# Patient Record
Sex: FEMALE | Race: WHITE | NOT HISPANIC OR LATINO | Employment: FULL TIME | URBAN - METROPOLITAN AREA
[De-identification: names, ages, dates, MRNs, and addresses within clinical notes are randomized per-mention and may not be internally consistent; named-entity substitution may affect disease eponyms.]

---

## 2017-01-16 ENCOUNTER — APPOINTMENT (OUTPATIENT)
Dept: WOMENS IMAGING | Facility: HOSPITAL | Age: 52
End: 2017-01-16

## 2017-01-16 PROCEDURE — 77067 SCR MAMMO BI INCL CAD: CPT | Performed by: RADIOLOGY

## 2018-06-14 ENCOUNTER — APPOINTMENT (OUTPATIENT)
Dept: WOMENS IMAGING | Facility: HOSPITAL | Age: 53
End: 2018-06-14

## 2018-06-14 PROCEDURE — 77063 BREAST TOMOSYNTHESIS BI: CPT | Performed by: RADIOLOGY

## 2018-06-14 PROCEDURE — 77067 SCR MAMMO BI INCL CAD: CPT | Performed by: RADIOLOGY

## 2018-10-29 ENCOUNTER — OFFICE VISIT CONVERTED (OUTPATIENT)
Dept: FAMILY MEDICINE CLINIC | Age: 53
End: 2018-10-29
Attending: FAMILY MEDICINE

## 2019-08-12 ENCOUNTER — OFFICE VISIT CONVERTED (OUTPATIENT)
Dept: FAMILY MEDICINE CLINIC | Age: 54
End: 2019-08-12
Attending: FAMILY MEDICINE

## 2019-08-12 ENCOUNTER — HOSPITAL ENCOUNTER (OUTPATIENT)
Dept: OTHER | Facility: HOSPITAL | Age: 54
Discharge: HOME | End: 2019-08-12
Attending: FAMILY MEDICINE

## 2019-08-12 LAB
ALBUMIN SERPL-MCNC: 4.2 G/DL (ref 3.5–5)
ALBUMIN/GLOB SERPL: 1.3 {RATIO} (ref 1.4–2.6)
ALP SERPL-CCNC: 76 U/L (ref 53–141)
ALT SERPL-CCNC: 21 U/L (ref 10–40)
ANION GAP SERPL CALC-SCNC: 18 MMOL/L (ref 8–19)
AST SERPL-CCNC: 25 U/L (ref 15–50)
BILIRUB SERPL-MCNC: 0.26 MG/DL (ref 0.2–1.3)
BUN SERPL-MCNC: 17 MG/DL (ref 5–25)
BUN/CREAT SERPL: 20 {RATIO} (ref 6–20)
CALCIUM SERPL-MCNC: 9.7 MG/DL (ref 8.7–10.4)
CHLORIDE SERPL-SCNC: 104 MMOL/L (ref 99–111)
CHOLEST SERPL-MCNC: 187 MG/DL (ref 107–200)
CHOLEST/HDLC SERPL: 4.9 {RATIO} (ref 3–6)
CONV CO2: 23 MMOL/L (ref 22–32)
CONV TOTAL PROTEIN: 7.5 G/DL (ref 6.3–8.2)
CREAT UR-MCNC: 0.84 MG/DL (ref 0.5–0.9)
ERYTHROCYTE [DISTWIDTH] IN BLOOD BY AUTOMATED COUNT: 12.5 % (ref 11.7–14.4)
GFR SERPLBLD BASED ON 1.73 SQ M-ARVRAT: >60 ML/MIN/{1.73_M2}
GLOBULIN UR ELPH-MCNC: 3.3 G/DL (ref 2–3.5)
GLUCOSE SERPL-MCNC: 90 MG/DL (ref 65–99)
HCT VFR BLD AUTO: 41.6 % (ref 37–47)
HDLC SERPL-MCNC: 38 MG/DL (ref 40–60)
HGB BLD-MCNC: 13.8 G/DL (ref 12–16)
LDLC SERPL CALC-MCNC: 106 MG/DL (ref 70–100)
MCH RBC QN AUTO: 30.8 PG (ref 27–31)
MCHC RBC AUTO-ENTMCNC: 33.2 G/DL (ref 33–37)
MCV RBC AUTO: 92.9 FL (ref 81–99)
OSMOLALITY SERPL CALC.SUM OF ELEC: 293 MOSM/KG (ref 273–304)
PLATELET # BLD AUTO: 170 10*3/UL (ref 130–400)
PMV BLD AUTO: 11.7 FL (ref 9.4–12.3)
POTASSIUM SERPL-SCNC: 3.8 MMOL/L (ref 3.5–5.3)
RBC # BLD AUTO: 4.48 10*6/UL (ref 4.2–5.4)
SODIUM SERPL-SCNC: 141 MMOL/L (ref 135–147)
T4 FREE SERPL-MCNC: 1.8 NG/DL (ref 0.9–1.8)
TRIGL SERPL-MCNC: 217 MG/DL (ref 40–150)
TSH SERPL-ACNC: 0.31 M[IU]/L (ref 0.27–4.2)
VLDLC SERPL-MCNC: 43 MG/DL (ref 5–37)
WBC # BLD AUTO: 4.7 10*3/UL (ref 4.8–10.8)

## 2019-10-02 ENCOUNTER — APPOINTMENT (OUTPATIENT)
Dept: WOMENS IMAGING | Facility: HOSPITAL | Age: 54
End: 2019-10-02

## 2019-10-02 PROCEDURE — 77063 BREAST TOMOSYNTHESIS BI: CPT | Performed by: RADIOLOGY

## 2019-10-02 PROCEDURE — 77067 SCR MAMMO BI INCL CAD: CPT | Performed by: RADIOLOGY

## 2020-12-08 ENCOUNTER — OFFICE VISIT CONVERTED (OUTPATIENT)
Dept: FAMILY MEDICINE CLINIC | Age: 55
End: 2020-12-08
Attending: FAMILY MEDICINE

## 2020-12-28 ENCOUNTER — APPOINTMENT (OUTPATIENT)
Dept: WOMENS IMAGING | Facility: HOSPITAL | Age: 55
End: 2020-12-28

## 2020-12-28 PROCEDURE — 77067 SCR MAMMO BI INCL CAD: CPT | Performed by: RADIOLOGY

## 2020-12-28 PROCEDURE — 77063 BREAST TOMOSYNTHESIS BI: CPT | Performed by: RADIOLOGY

## 2021-01-07 ENCOUNTER — HOSPITAL ENCOUNTER (OUTPATIENT)
Dept: OTHER | Facility: HOSPITAL | Age: 56
Discharge: HOME | End: 2021-01-07
Attending: FAMILY MEDICINE

## 2021-01-07 LAB
ALBUMIN SERPL-MCNC: 4.3 G/DL (ref 3.5–5)
ALBUMIN/GLOB SERPL: 1.3 {RATIO} (ref 1.4–2.6)
ALP SERPL-CCNC: 87 U/L (ref 53–141)
ALT SERPL-CCNC: 32 U/L (ref 10–40)
ANION GAP SERPL CALC-SCNC: 13 MMOL/L (ref 8–19)
AST SERPL-CCNC: 30 U/L (ref 15–50)
BILIRUB SERPL-MCNC: 0.4 MG/DL (ref 0.2–1.3)
BUN SERPL-MCNC: 15 MG/DL (ref 5–25)
BUN/CREAT SERPL: 17 {RATIO} (ref 6–20)
CALCIUM SERPL-MCNC: 9.4 MG/DL (ref 8.7–10.4)
CHLORIDE SERPL-SCNC: 101 MMOL/L (ref 99–111)
CHOLEST SERPL-MCNC: 215 MG/DL (ref 107–200)
CHOLEST/HDLC SERPL: 4.5 {RATIO} (ref 3–6)
CONV CO2: 28 MMOL/L (ref 22–32)
CONV TOTAL PROTEIN: 7.6 G/DL (ref 6.3–8.2)
CREAT UR-MCNC: 0.87 MG/DL (ref 0.5–0.9)
ERYTHROCYTE [DISTWIDTH] IN BLOOD BY AUTOMATED COUNT: 12.1 % (ref 11.5–14.5)
GFR SERPLBLD BASED ON 1.73 SQ M-ARVRAT: >60 ML/MIN/{1.73_M2}
GLOBULIN UR ELPH-MCNC: 3.3 G/DL (ref 2–3.5)
GLUCOSE SERPL-MCNC: 90 MG/DL (ref 65–99)
HBA1C MFR BLD: 15.4 G/DL (ref 12–16)
HCT VFR BLD AUTO: 44.9 % (ref 37–47)
HDLC SERPL-MCNC: 48 MG/DL (ref 40–60)
LDLC SERPL CALC-MCNC: 143 MG/DL (ref 70–100)
MCH RBC QN AUTO: 30.7 PG (ref 27–31)
MCHC RBC AUTO-ENTMCNC: 34.3 G/DL (ref 33–37)
MCV RBC AUTO: 89.6 FL (ref 81–99)
OSMOLALITY SERPL CALC.SUM OF ELEC: 286 MOSM/KG (ref 273–304)
PLATELET # BLD AUTO: 186 10*3/UL (ref 130–400)
PMV BLD AUTO: 11.1 FL (ref 7.4–10.4)
POTASSIUM SERPL-SCNC: 3.9 MMOL/L (ref 3.5–5.3)
RBC # BLD AUTO: 5.01 10*6/UL (ref 4.2–5.4)
SODIUM SERPL-SCNC: 138 MMOL/L (ref 135–147)
TRIGL SERPL-MCNC: 120 MG/DL (ref 40–150)
VLDLC SERPL-MCNC: 24 MG/DL (ref 5–37)
WBC # BLD AUTO: 5.5 10*3/UL (ref 4.8–10.8)

## 2021-05-18 NOTE — PROGRESS NOTES
Mack, Caity BRYSON 1965     Office/Outpatient Visit    Visit Date: Mon, Oct 29, 2018 03:55 pm    Provider: Preston Stafford MD     Location: [facility]        Electronically signed by Preston Stafford MD on  10/30/2018 06:47:05 AM                             SUBJECTIVE:        CC: blood pressure         HPI:         Patient presents with hypertension.  Her current cardiac medication regimen includes an angiotensin receptor blocker ( Cozaar ).  She has not kept a blood pressure diary, but states that pressures have been okay.  She is tolerating the medication well without side effects.  Compliance with treatment has been good; she takes her medication as directed and follows up as directed.      ROS:     CONSTITUTIONAL:  Negative for chills and fever.      CARDIOVASCULAR:  Negative for chest pain and palpitations.      RESPIRATORY:  Negative for recent cough and dyspnea.      GASTROINTESTINAL:  Negative for abdominal pain, nausea and vomiting.          PM/[facility]/:     Last Reviewed on 10/29/2018 03:55 PM by Preston Stafford    Past Medical History:                 PAST MEDICAL HISTORY             PREVENTIVE HEALTH MAINTENANCE             COLORECTAL CANCER SCREENING: Up to date (colonoscopy q10y; sigmoidoscopy q5y; Cologuard q3y) was last done 05/2018, Results are in chart; colonoscopy with normal results     MAMMOGRAM: Done within last 2 years and results in are chart was last done 01/2017 with normal results         Surgical History:         L Ankle;         Family History:         Positive for Coronary Artery Disease ( father ).      Positive for Colon Cancer ( father ).      Positive for Type 2 Diabetes ( father ) and Hypothyroidism ( mother ).          Social History:     Occupation: Employed at [employer]. ;     Marital Status:      Children: 3 children         Tobacco/Alcohol/Supplements:     Last Reviewed on 10/29/2018 03:55 PM by Preston Stafford  German    Tobacco: She has never smoked.          Substance Abuse History:     Last Reviewed on 10/29/2018 03:55 PM by Preston Stafford    NEGATIVE         Mental Health History:     Last Reviewed on 10/29/2018 03:55 PM by Preston Stafford        Communicable Diseases (eg STDs):     Last Reviewed on 10/29/2018 03:55 PM by Preston Stafford            Current Problems:     Last Reviewed on 10/29/2018 03:55 PM by Preston Stafford    Hypertension     Acquired hypothyroidism         Immunizations:     None        Allergies:     Last Reviewed on 10/29/2018 03:55 PM by Preston Stafford      No Known Drug Allergies.         Current Medications:     Last Reviewed on 10/29/2018 03:55 PM by Preston Stafford    Losartan/Hydrochlorothiazide 100mg/25mg Tablet Take 1 tablet(s) by mouth daily     Synthroid 0.15mg Tablet 1 tab daily     Ferrous Sulfate         OBJECTIVE:        Vitals:         Current: 10/29/2018 4:06:42 PM    Ht:  5 ft, 8.25 in;  Wt: 171.4 lbs;  BMI: 25.9    T: 97.4 F (oral);  BP: 134/75 mm Hg (right arm, sitting);  P: 60 bpm (left arm (BP Cuff), sitting);  sCr: 0.63 mg/dL;  GFR: 116.01        Exams:     PHYSICAL EXAM:     GENERAL: vital signs recorded - well developed, well nourished;  no apparent distress;     EYES: extraocular movements intact; conjunctiva and cornea are normal; PERRLA;     E/N/T:  normal EACs, TMs, nasal/oral mucosa, teeth, gingiva, and oropharynx;     NECK: range of motion is normal; thyroid is non-palpable;     RESPIRATORY: normal respiratory rate and pattern with no distress; normal breath sounds with no rales, rhonchi, wheezes or rubs;     CARDIOVASCULAR: normal rate; rhythm is regular;  no systolic murmur; no edema;     GASTROINTESTINAL: nontender; normal bowel sounds; no organomegaly;     BREAST/INTEGUMENT: SKIN: no significant rashes or lesions; no suspicious moles;     PSYCHIATRIC:  appropriate affect and demeanor; normal speech pattern; grossly  normal memory;         ASSESSMENT           401.1   I10  Hypertension              DDx:         ORDERS:         Meds Prescribed:       Refill of: Losartan/Hydrochlorothiazide 100mg/12.5mg Tablet Take 1 tablet(s) by mouth daily  #90 (Ninety) tablet(s) Refills: 1         Radiology/Test Orders:       3014F  Screening mammography results documented and reviewed (PV)1  (In-House)         3017F  Colorectal CA screen results documented and reviewed (PV)  (In-House)           Lab Orders:       [id number]  HTNLP - [facility] CMP AND LIPID: [id number], [id number]  (Send-Out)           Other Orders:         Calculated BMI above the upper parameter and a follow-up plan was documented in the medical record  (In-House)                   PLAN:          Hypertension Katja's blood pressure is okay today.  We are going to add back a low dose of HCTZ for the winter months anyway.  We will arrange fasting labs at her convenience.  She declines my flu shot.      LABORATORY:  Labs ordered to be performed today include HTN/Lipid Panel: CMP, Lipid.  MIPS     MAMMOGRAM: Done within last 2 years and results in are chart     COLORECTAL CANCER SCREENING: Results are in chart     BMI Elevated - Follow-Up Plan: She was provided education on weight loss strategies           Prescriptions:       Refill of: Losartan/Hydrochlorothiazide 100mg/12.5mg Tablet Take 1 tablet(s) by mouth daily  #90 (Ninety) tablet(s) Refills: 1           Orders:       [id number]  HTN - [facility] CMP AND LIPID: [id number], [id number]  (Send-Out)         3014F  Screening mammography results documented and reviewed (PV)1  (In-House)         3017F  Colorectal CA screen results documented and reviewed (PV)  (In-House)           Calculated BMI above the upper parameter and a follow-up plan was documented in the medical record  (In-House)             Patient Education Handouts:       High Blood Pressure (HTN)              CHARGE CAPTURE           **Please note: ICD descriptions below are intended for billing  purposes only and may not represent clinical diagnoses**        Primary Diagnosis:         401.1 Hypertension            I10    Essential (primary) hypertension              Orders:          73921   Office/outpatient visit; established patient, level 3  (In-House)             3014F   Screening mammography results documented and reviewed (PV)1  (In-House)             3017F   Colorectal CA screen results documented and reviewed (PV)  (In-House)                Calculated BMI above the upper parameter and a follow-up plan was documented in the medical record  (In-House)

## 2021-05-18 NOTE — PROGRESS NOTES
Caity Mack  1965     Office/Outpatient Visit    Visit Date: Tue, Dec 8, 2020 12:47 pm    Provider: Preston Stafford MD (Assistant: Johanne Dickens,  )    Location: [facility]        Electronically signed by Preston Stafford MD on  12/09/2020 09:06:17 AM                             Subjective:        CC: physical exam, blood pressure, thyroid        TELEMEDICINE VISIT:    - Katja consented to this telemedicine visit.    - Persons present during the telemedicine consultation include:  Katja - patient, Dr. Stafford    - This visit is being conducted over FaceTime with audio and video.    HPI:       Katja is being seen for wellness exam.  She is 55 years old and works for [employer] as a .  She has been there since 1999.  She does not smoke.  She uses some alcohol - maybe every few months.  She is up to date on colonoscopy and mammogram.          With regard to the hypothyroidism, unspecified, she is currently taking Synthroid, 150 mcg daily.  TSH was last checked several months ago.  She does not know the results of that test.  She denies any related symptoms.            In regard to the essential (primary) hypertension, her current cardiac medication regimen includes a combination medication ( Hyzaar ).  She has not kept a blood pressure diary, but states that pressures have been well controlled.  She is tolerating the medication well without side effects.  Compliance with treatment has been good; she takes her medication as directed and follows up as directed.      ROS:     CONSTITUTIONAL:  Negative for chills and fever.      CARDIOVASCULAR:  Negative for chest pain and palpitations.      RESPIRATORY:  Negative for recent cough and dyspnea.      GASTROINTESTINAL:  Negative for abdominal pain, nausea and vomiting.      INTEGUMENTARY/BREAST:  Negative for atypical mole(s) and rash.          Past Medical History / Family History / Social History:         Last  Reviewed on 12/08/2020 12:58 PM by Preston Stafford    Past Medical History:                 PAST MEDICAL HISTORY         Hypertension     Hypothyroidism         CURRENT MEDICAL PROVIDERS:    Endocrinologist: Bladimir Ronquillo         PREVENTIVE HEALTH MAINTENANCE             COLORECTAL CANCER SCREENING: Up to date (colonoscopy q10y; sigmoidoscopy q5y; Cologuard q3y) was last done 05/2018, Results are in chart; colonoscopy with normal results     MAMMOGRAM: Done within last 2 years and results in are chart was last done 06/2018 with normal results         Surgical History:         L Ankle;         Family History:         Positive for Coronary Artery Disease ( father ).      Positive for Colon Cancer ( father ).      Positive for Type 2 Diabetes ( father ) and Hypothyroidism ( mother ).          Social History:     Occupation: Employed at [employer]. ;     Marital Status:      Children: 3 children         Tobacco/Alcohol/Supplements:     Last Reviewed on 12/08/2020 12:58 PM by Preston Stafford    Tobacco: She has never smoked.          Substance Abuse History:     Last Reviewed on 12/08/2020 12:58 PM by Preston Stafford    NEGATIVE         Mental Health History:     Last Reviewed on 12/08/2020 12:58 PM by Preston Stafford        Communicable Diseases (eg STDs):     Last Reviewed on 12/08/2020 12:58 PM by Preston Stafford        Current Problems:     Last Reviewed on 12/08/2020 12:58 PM by Preston Stafford    Hypothyroidism, unspecified    Essential (primary) hypertension    Other fatigue        Immunizations:     None        Allergies:     Last Reviewed on 12/08/2020 12:58 PM by Preston Stafford    No Known Allergies.        Current Medications:     Last Reviewed on 12/08/2020 12:58 PM by Preston Stafford    Synthroid 0.137mg Tablet [Take 1 tablet daily by mouth daily]    aspirin 81 mg oral tablet, delayed release (enteric coated) [1 tab  daily]    hydroCHLOROthiazide 12.5 mg oral capsule [take 1 capsule (12.5 mg) by oral route once daily]    losartan 100 mg oral tablet [take 1 tablet (100 mg) by oral route once daily]        Objective:        Exams:     PHYSICAL EXAM:     GENERAL: well developed, well nourished;  no apparent distress;     EYES: extraocular movements intact; conjunctiva and cornea are normal; PERRL;     RESPIRATORY: normal respiratory rate and pattern with no distress;     LYMPHATIC: no enlargement of cervical or facial nodes; no supraclavicular nodes;     BREAST/INTEGUMENT: SKIN: no significant rashes or lesions; no suspicious moles;     NEUROLOGIC: mental status: alert and oriented x 3; cranial nerves II-XII grossly intact;     PSYCHIATRIC: appropriate affect and demeanor; normal psychomotor function;         Assessment:         Z00.01   Encounter for general adult medical examination with abnormal findings       E03.9   Hypothyroidism, unspecified       I10   Essential (primary) hypertension           ORDERS:         Meds Prescribed:       [New Rx] losartan-hydrochlorothiazide 100-12.5 mg oral tablet [take 1 tablet by oral route once daily], #90 (ninety) tablets, Refills: 3 (three)         Lab Orders:       [id number]  CB - [facility] CBC w/o diff  (Send-Out)            30706  HTN - [facility] CMP AND LIPID: [id number], [id number]  (Send-Out)                      Plan:         Encounter for general adult medical examination with abnormal findings    LABORATORY:  Labs ordered to be performed today include CBC W/O DIFF and HTN/Lipid Panel: CMP, Lipid.      RECOMMENDATIONS given include: Katja is doing well overall.  We will refill her medication as noted below.  No other near term change is anticipated.  We will reach out to Dr. Ronquillo for a copy of his most recent consult and testing.  She is up to date on cancer screenings..            Orders:       [id number]  BDCB2 - [facility] CBC w/o diff  (Send-Out)            14235  HTN - [facility] CMP AND LIPID: [id number], [id number]   (Send-Out)              Hypothyroidism, unspecifiedAs above.        Essential (primary) hypertension          Prescriptions:       [New Rx] losartan-hydrochlorothiazide 100-12.5 mg oral tablet [take 1 tablet by oral route once daily], #90 (ninety) tablets, Refills: 3 (three)             Charge Capture:         Primary Diagnosis:     Z00.01  Encounter for general adult medical examination with abnormal findings           Orders:      [id number]  Preventive medicine, established patient, age 40-64 years  (In-House)              E03.9  Hypothyroidism, unspecified     I10  Essential (primary) hypertension

## 2021-05-18 NOTE — PROGRESS NOTES
Caity Mack BRYSON 1965     Office/Outpatient Visit    Visit Date: Mon, Aug 12, 2019 04:00 pm    Provider: Preston Stafford MD (Assistant: Joanne Clemens RN)    Location: [facility]        Electronically signed by Preston Stafford MD on  08/12/2019 09:33:37 PM                             SUBJECTIVE:        CC: blood pressure         HPI:         Patient presents with hypertension.  Her current cardiac medication regimen includes a combination medication ( Hyzaar ).  She has not kept a blood pressure diary, but states that pressures have been well controlled.  She is tolerating the medication well without side effects.  Compliance with treatment has been good; she takes her medication as directed and follows up as directed.      ROS:     CONSTITUTIONAL:  Negative for chills and fever.      CARDIOVASCULAR:  Negative for chest pain and palpitations.      RESPIRATORY:  Negative for recent cough and dyspnea.      GASTROINTESTINAL:  Negative for abdominal pain, nausea and vomiting.      INTEGUMENTARY/BREAST:  Negative for atypical mole(s) and rash.          PM/[facility]/SH:     Last Reviewed on 8/12/2019 04:42 PM by Preston Stafford    Past Medical History:                 PAST MEDICAL HISTORY         Hypertension     Hypothyroidism         CURRENT MEDICAL PROVIDERS:    Endocrinologist: Bladimir Ronquillo         PREVENTIVE HEALTH MAINTENANCE             COLORECTAL CANCER SCREENING: Up to date (colonoscopy q10y; sigmoidoscopy q5y; Cologuard q3y) was last done 05/2018, Results are in chart; colonoscopy with normal results     MAMMOGRAM: Done within last 2 years and results in are chart was last done 06/2018 with normal results         Surgical History:         L Ankle;         Family History:         Positive for Coronary Artery Disease ( father ).      Positive for Colon Cancer ( father ).      Positive for Type 2 Diabetes ( father ) and Hypothyroidism ( mother ).          Social History:     Occupation:  Employed at [employer]. ;     Marital Status:      Children: 3 children         Tobacco/Alcohol/Supplements:     Last Reviewed on 8/12/2019 04:42 PM by Preston Stafford    Tobacco: She has never smoked.          Substance Abuse History:     Last Reviewed on 8/12/2019 04:42 PM by Preston Stafford    NEGATIVE         Mental Health History:     Last Reviewed on 8/12/2019 04:42 PM by Preston Stafford        Communicable Diseases (eg STDs):     Last Reviewed on 8/12/2019 04:42 PM by Preston Stafford            Current Problems:     Last Reviewed on 8/12/2019 04:42 PM by Preston Stafford    Malaise and Fatigue     Hypertension     Acquired hypothyroidism         Immunizations:     None        Allergies:     Last Reviewed on 8/12/2019 04:42 PM by Preston Stafford      No Known Drug Allergies.         Current Medications:     Last Reviewed on 8/12/2019 04:42 PM by Preston Stafford    Losartan/Hydrochlorothiazide 100mg/12.5mg Tablet Take 1 tablet(s) by mouth daily     Synthroid 0.137mg Tablet Take 1 tablet daily by mouth daily     Aspirin (ASA) 81mg Tablets, Enteric Coated 1 tab daily         OBJECTIVE:        Vitals:         Current: 8/12/2019 4:04:15 PM    Ht:  5 ft, 8.25 in;  Wt: 175.6 lbs;  BMI: 26.5    T: 97.7 F (oral);  BP: 137/70 mm Hg (right arm, sitting);  P: 58 bpm (right arm (BP Cuff), sitting);  sCr: 0.63 mg/dL;  GFR: 115.93        Exams:     PHYSICAL EXAM:     GENERAL: vital signs recorded - well developed, well nourished;  no apparent distress;     EYES: extraocular movements intact; conjunctiva and cornea are normal; PERRL;     E/N/T:  normal EACs, TMs, nasal/oral mucosa, teeth, gingiva, and oropharynx;     NECK: range of motion is normal; thyroid is non-palpable;     RESPIRATORY: normal respiratory rate and pattern with no distress; normal breath sounds with no rales, rhonchi, wheezes or rubs;     CARDIOVASCULAR: normal rate; rhythm  is regular;  no systolic murmur; no edema;     GASTROINTESTINAL: nontender; normal bowel sounds; no organomegaly;     LYMPHATIC: no enlargement of cervical or facial nodes; no supraclavicular nodes;     BREAST/INTEGUMENT: SKIN: no significant rashes or lesions; no suspicious moles;         ASSESSMENT           401.1   I10  Hypertension              DDx:     244.8   E03.9  Acquired hypothyroidism              DDx:     780.79   R53.83  Malaise and Fatigue              DDx:         ORDERS:         Meds Prescribed:       Refill of: Losartan/Hydrochlorothiazide 100mg/12.5mg Tablet Take 1 tablet(s) by mouth daily  #90 (Ninety) tablet(s) Refills: 3         Radiology/Test Orders:       3014F  Screening mammography results documented and reviewed (PV)1  (In-House)         3017F  Colorectal CA screen results documented and reviewed (PV)  (In-House)           Lab Orders:       [id number]  BDCB2 - [facility] CBC w/o diff  (Send-Out)  (PLEASE USE Z00.01 AS PRIMARY CODE        ==================================)       68288  COMP - [facility] Comp. Metabolic Panel  (Send-Out)         82091  THYII - [facility] Thyroid panel with TSH (77057, 12738)  (Send-Out)           Other Orders:         Depression screen negative  (In-House)           Negative EtOH screen  (In-House)                   PLAN:          Hypertension     Today, we have reviewed Katja's care.  I'm going to refill needed medications for her as noted and update labs given some ongoing fatigue.  We will follow from there.     MIPS PHQ-9 Depression Screening: Completed form scanned and in chart; Total Score 4; Negative Depression Screen Negative alcohol screen           Prescriptions:       Refill of: Losartan/Hydrochlorothiazide 100mg/12.5mg Tablet Take 1 tablet(s) by mouth daily  #90 (Ninety) tablet(s) Refills: 3           Orders:         Depression screen negative  (In-House)           Negative EtOH screen  (In-House)         3014F  Screening mammography results documented  and reviewed (PV)1  (In-House)         3017F  Colorectal CA screen results documented and reviewed (PV)  (In-House)             Patient Education Handouts:       High Blood Pressure (HTN)           Acquired hypothyroidism     LABORATORY:  Labs ordered to be performed today include Thyroid Panel.            Orders:       [id number]  THYII - [facility] Thyroid panel with TSH (68469, 36333)  (Send-Out)            Malaise and Fatigue     LABORATORY:  Labs ordered to be performed today include CBC W/O DIFF and Comprehensive metabolic panel.            Orders:       [id number]  BDCB2 - [facility] CBC w/o diff  (Send-Out)  (PLEASE USE Z00.01 AS PRIMARY CODE        ==================================)       17359  COMP - [facility] Comp. Metabolic Panel  (Send-Out)               CHARGE CAPTURE           **Please note: ICD descriptions below are intended for billing purposes only and may not represent clinical diagnoses**        Primary Diagnosis:         401.1 Hypertension            I10    Essential (primary) hypertension              Orders:          [id number]   Office/outpatient visit; established patient, level 3  (In-House)                Depression screen negative  (In-House)                Negative EtOH screen  (In-House)             3014F   Screening mammography results documented and reviewed (PV)1  (In-House)             3017F   Colorectal CA screen results documented and reviewed (PV)  (In-House)           244.8 Acquired hypothyroidism            E03.9    Hypothyroidism, unspecified    780.79 Malaise and Fatigue            R53.83    Other fatigue

## 2021-07-01 VITALS
TEMPERATURE: 97.4 F | DIASTOLIC BLOOD PRESSURE: 75 MMHG | HEIGHT: 68 IN | HEART RATE: 60 BPM | WEIGHT: 171.4 LBS | SYSTOLIC BLOOD PRESSURE: 134 MMHG | BODY MASS INDEX: 25.98 KG/M2

## 2021-07-01 VITALS
DIASTOLIC BLOOD PRESSURE: 70 MMHG | HEIGHT: 68 IN | SYSTOLIC BLOOD PRESSURE: 137 MMHG | TEMPERATURE: 97.7 F | WEIGHT: 175.6 LBS | HEART RATE: 58 BPM | BODY MASS INDEX: 26.61 KG/M2

## 2021-09-14 ENCOUNTER — OFFICE VISIT (OUTPATIENT)
Dept: FAMILY MEDICINE CLINIC | Age: 56
End: 2021-09-14

## 2021-09-14 ENCOUNTER — TELEPHONE (OUTPATIENT)
Dept: FAMILY MEDICINE CLINIC | Age: 56
End: 2021-09-14

## 2021-09-14 VITALS
HEART RATE: 66 BPM | WEIGHT: 178 LBS | BODY MASS INDEX: 26.98 KG/M2 | TEMPERATURE: 97.6 F | DIASTOLIC BLOOD PRESSURE: 75 MMHG | HEIGHT: 68 IN | SYSTOLIC BLOOD PRESSURE: 133 MMHG

## 2021-09-14 DIAGNOSIS — R51.9 NONINTRACTABLE EPISODIC HEADACHE, UNSPECIFIED HEADACHE TYPE: ICD-10-CM

## 2021-09-14 DIAGNOSIS — R04.0 EPISTAXIS: Primary | ICD-10-CM

## 2021-09-14 PROCEDURE — 99213 OFFICE O/P EST LOW 20 MIN: CPT | Performed by: FAMILY MEDICINE

## 2021-09-14 RX ORDER — LEVOTHYROXINE SODIUM 0.15 MG/1
150 TABLET ORAL DAILY
COMMUNITY
Start: 2021-06-07 | End: 2021-12-08 | Stop reason: SDUPTHER

## 2021-09-14 RX ORDER — ASPIRIN 81 MG/1
81 TABLET ORAL DAILY
COMMUNITY

## 2021-09-14 RX ORDER — LOSARTAN POTASSIUM AND HYDROCHLOROTHIAZIDE 12.5; 1 MG/1; MG/1
1 TABLET ORAL DAILY
COMMUNITY
Start: 2021-07-20 | End: 2021-12-08 | Stop reason: SDUPTHER

## 2021-09-14 RX ORDER — LOSARTAN POTASSIUM 100 MG/1
100 TABLET ORAL DAILY
Qty: 30 TABLET | Refills: 0 | Status: SHIPPED | OUTPATIENT
Start: 2021-09-14 | End: 2021-12-08 | Stop reason: ALTCHOICE

## 2021-09-14 NOTE — PATIENT INSTRUCTIONS
Nosebleed, Adult  A nosebleed is when blood comes out of the nose. Nosebleeds are common. Usually, they are not a sign of a serious condition.  Nosebleeds can happen if a blood vessel in your nose starts to bleed or if the lining of your nose (mucous membrane) cracks. They are commonly caused by:  · Allergies.  · Colds.  · Picking your nose.  · Blowing your nose too hard.  · An injury from sticking an object into your nose or getting hit in the nose.  · Dry or cold air.  Less common causes of nosebleeds include:  · Toxic fumes.  · Something abnormal in the nose or in the air-filled spaces in the bones of the face (sinuses).  · Growths in the nose, such as polyps.  · Blood thinners or conditions that cause blood to clot slowly.  · Certain illnesses or procedures that irritate or dry out the nasal passages.  Follow these instructions at home:  When you have a nosebleed:    · Sit down and tilt your head slightly forward.  · Use a clean towel or tissue to pinch your nostrils under the bony part of your nose. After 5 minutes, let go of your nose and see if bleeding starts again. Do not release pressure before that time. If there is still bleeding, repeat the pinching and holding for 5 minutes or until the bleeding stops.  · Do not place tissues or gauze in the nose to stop the bleeding.  · Avoid lying down and avoid tilting your head backward. That may make blood collect in the throat and cause gagging or coughing.  · Use a nasal spray decongestant to help with a nosebleed as told by your health care provider.    After a nosebleed:  · Avoid blowing your nose or sniffing for a number of hours.  · Avoid straining, lifting, or bending at the waist for several days. You may go back to other normal activities as you are able.  · If you are taking aspirin or blood thinners and you have nosebleeds, talk to your health care provider. These medicines make bleeding more likely.  ? Ask your health care provider if you should stop  taking the medicines or if you should adjust the dose.  ? Do not stop taking medicines that your health care provider has recommended unless he or she tells you to stop taking them.  · If your nosebleed was caused by dry mucous membranes, use over-the-counter saline nasal spray or gel and a humidifier as told by your health care provider. This will keep the mucous membranes moist and allow them to heal. If you need to use one of these products:  ? Choose one that is water-soluble.  ? Use only as much as you need and use it only as often as needed.  ? Do not lie down right after you use it.  · If you get nosebleeds often, talk with your health care provider about medical treatments. Options may include:  ? Nasal cautery. This treatment stops and prevents nosebleeds by using a chemical swab or electrical device to lightly burn tiny blood vessels inside the nose.  ? Nasal packing. A gauze or other material is placed in the nose to keep constant pressure on the bleeding area.  Contact a health care provider if you:  · Have a fever.  · Get nosebleeds often or more often than usual.  · Bruise very easily.  · Have a nosebleed from having something stuck in your nose.  · Have bleeding in your mouth.  · Vomit or cough up brown material.  · Have a nosebleed after you start a new medicine.  Get help right away if:  · You have a nosebleed after a fall or a head injury.  · Your nosebleed does not go away after 20 minutes.  · You feel dizzy or weak.  · You have unusual bleeding from other parts of your body.  · You have unusual bruising on other parts of your body.  · You become sweaty.  · You vomit blood.  Summary  · A nosebleed is when blood comes out of the nose. Common causes include allergies, an injury to the nose, or cold or dry air.  · Initial treatment includes applying pressure for 5 minutes.  · Moisturizing the nose with saline nasal spray or gel after a nosebleed may help prevent future bleeding.  · Get help right  away if your nosebleed does not go away after 20 minutes.  This information is not intended to replace advice given to you by your health care provider. Make sure you discuss any questions you have with your health care provider.  Document Revised: 10/15/2020 Document Reviewed: 10/15/2020  Elsevier Patient Education © 2021 [employer]

## 2021-09-14 NOTE — PROGRESS NOTES
Caity Mack presents to [facility].    Chief Complaint:  'last week I had a bloody nose'    Subjective       History of Present Illness:  Katja is in today for follow-up on a bloody nose.  She says that she had this developed last week.  It seemed to just come out of nowhere.  She does not use any kind of nasal steroid spray.  This lasted for about 20 minutes and then resolved.  However, she did have some sharp pain that developed on the right side of her temple.  This was severe enough to wake her up in the middle of the night.  This would last a few seconds and then resolved.  She denies watering of the eye or runny nose with this.  She had no obvious neurologic symptom with this.  This does seem to be getting better.    With regard to the headache, she had this pretty severe pain over the right eye and into the temple.  It would last a few seconds and then resolved.  However, at one point she was having 10-12 episodes in an hour.  The pain was pretty intense when present.      Review of Systems:  Review of Systems   Constitutional: Negative for chills and fever.   Respiratory: Negative for cough and shortness of breath.    Cardiovascular: Negative for chest pain and palpitations.   Gastrointestinal: Negative for abdominal pain, nausea and vomiting.        Objective   Medical History:  Past Medical History:   • Acquired hypothyroidism   • Essential hypertension     Past Surgical History:   • ANKLE SURGERY   • HYSTERECTOMY    Partial      Family History   Problem Relation Age of Onset   • Dementia Mother    • Coronary artery disease Mother    • Hypertension Mother    • Colon cancer Father    • Diabetes type II Father      Social History     Tobacco Use   • Smoking status: Never Smoker   • Smokeless tobacco: Never Used   Substance Use Topics   • Alcohol use: Yes     Comment: very rarely       Health Maintenance Due   Topic Date Due   • MAMMOGRAM  Never done   • ANNUAL PHYSICAL   "Never done   • TDAP/TD VACCINES (1 - Tdap) Never done   • ZOSTER VACCINE (1 of 2) Never done   • HEPATITIS C SCREENING  Never done   • PAP SMEAR  Never done        Immunization History   Administered Date(s) Administered   • COVID-19 (MODERNA) 02/05/2021, 03/05/2021   • Hepatitis B Vaccine Adult IM 10/08/1999, 12/02/1999, 05/02/2000       No Known Allergies     Medications:  Current Outpatient Medications on File Prior to Visit   Medication Sig   • aspirin 81 MG EC tablet Take 81 mg by mouth Daily.   • levothyroxine (SYNTHROID, LEVOTHROID) 150 MCG tablet Take 150 mcg by mouth Daily.   • losartan-hydrochlorothiazide (HYZAAR) 100-12.5 MG per tablet Take 1 tablet by mouth Daily.     No current facility-administered medications on file prior to visit.       Vital Signs:   /75 (BP Location: Right arm, Patient Position: Sitting)   Pulse 66   Temp 97.6 °F (36.4 °C) (Oral)   Ht 173.4 cm (68.27\")   Wt 80.7 kg (178 lb)   BMI 26.85 kg/m²       Physical Exam:  Physical Exam  Vitals and nursing note reviewed.   Constitutional:       General: She is not in acute distress.     Appearance: She is not ill-appearing.   HENT:      Right Ear: Tympanic membrane and ear canal normal.      Left Ear: Tympanic membrane and ear canal normal.      Nose:      Comments: I do not see an obvious source for bleeding in the right nostril.     Mouth/Throat:      Mouth: Mucous membranes are moist.      Comments: Pharynx appears normal  Eyes:      Extraocular Movements: Extraocular movements intact.      Pupils: Pupils are equal, round, and reactive to light.   Neck:      Thyroid: No thyromegaly.   Cardiovascular:      Rate and Rhythm: Normal rate and regular rhythm.      Heart sounds: No murmur heard.     Pulmonary:      Effort: Pulmonary effort is normal.      Breath sounds: Normal breath sounds.   Abdominal:      General: There is no distension.      Palpations: Abdomen is soft. There is no mass.      Tenderness: There is no abdominal " tenderness.   Musculoskeletal:      Cervical back: Normal range of motion.   Skin:     Findings: No lesion or rash.   Neurological:      General: No focal deficit present.      Mental Status: She is oriented to person, place, and time.      Cranial Nerves: No cranial nerve deficit.      Motor: No weakness.   Psychiatric:         Mood and Affect: Mood normal.         Result Review      The following data was reviewed by Preston Stafford MD on 09/14/2021.  Lab Results   Component Value Date    WBC 5.50 01/07/2021    HGB 15.40 01/07/2021    HCT 44.9 01/07/2021    MCV 89.6 01/07/2021    .00 01/07/2021     Lab Results   Component Value Date    BUN 15 01/07/2021    CREATININE 0.87 01/07/2021    BCR 17 01/07/2021    K 3.9 01/07/2021    CO2 28 01/07/2021    CALCIUM 9.4 01/07/2021    ALBUMIN 4.3 01/07/2021    LABIL2 1.3 (L) 01/07/2021    AST 30 01/07/2021    ALT 32 01/07/2021     Lab Results   Component Value Date    CHLPL 215 (H) 01/07/2021    TRIG 120 01/07/2021    HDL 48 01/07/2021     (H) 01/07/2021     Lab Results   Component Value Date    TSH 0.306 08/12/2019            Assessment and Plan:   Today, we have reviewed her care.  I do not see an obviously worrisome finding.  For the near term, I have recommended she use a little bit of nasal saline spray.  If her symptoms do not resolve completely, we may consider ENT referral or other evaluation.  I considered whether she might be having a cluster type headache.  However, her symptoms do not really endorse that given the lack of autonomic features.  We will see how things progress.       Diagnoses and all orders for this visit:    1. Epistaxis (Primary)    2. Nonintractable episodic headache, unspecified headache type    Other orders  -     losartan (Cozaar) 100 MG tablet; Take 1 tablet by mouth Daily.  Dispense: 30 tablet; Refill: 0          Follow Up   Return if symptoms worsen or fail to improve.  Patient was given instructions and counseling  regarding her condition or for health maintenance advice. Please see specific information pulled into the AVS if appropriate.

## 2021-09-14 NOTE — TELEPHONE ENCOUNTER
We really did not make a change.  She asked for a 30-day supply to have for travel.  She does not like to take the medicine with HCTZ when she travels due to urinary frequency.  She may have already communicated this to them.  Thanks.

## 2021-09-14 NOTE — TELEPHONE ENCOUNTER
Pharm states pt has been getting Losartan/HCTZ 100/12.5mg, did you mean to change to just losartan? Please advise.

## 2021-12-08 ENCOUNTER — OFFICE VISIT (OUTPATIENT)
Dept: FAMILY MEDICINE CLINIC | Age: 56
End: 2021-12-08

## 2021-12-08 ENCOUNTER — LAB (OUTPATIENT)
Dept: LAB | Facility: HOSPITAL | Age: 56
End: 2021-12-08

## 2021-12-08 VITALS
HEART RATE: 69 BPM | HEIGHT: 68 IN | WEIGHT: 177 LBS | SYSTOLIC BLOOD PRESSURE: 133 MMHG | OXYGEN SATURATION: 98 % | BODY MASS INDEX: 26.83 KG/M2 | DIASTOLIC BLOOD PRESSURE: 61 MMHG

## 2021-12-08 DIAGNOSIS — E03.9 ACQUIRED HYPOTHYROIDISM: ICD-10-CM

## 2021-12-08 DIAGNOSIS — E03.9 ACQUIRED HYPOTHYROIDISM: Primary | ICD-10-CM

## 2021-12-08 DIAGNOSIS — I10 ESSENTIAL HYPERTENSION: ICD-10-CM

## 2021-12-08 PROBLEM — E03.8 HYPOTHYROIDISM DUE TO HASHIMOTO'S THYROIDITIS: Status: ACTIVE | Noted: 2021-11-23

## 2021-12-08 PROBLEM — E06.3 HYPOTHYROIDISM DUE TO HASHIMOTO'S THYROIDITIS: Status: ACTIVE | Noted: 2021-11-23

## 2021-12-08 LAB
T-UPTAKE NFR SERPL: 0.96 TBI (ref 0.8–1.3)
T4 SERPL-MCNC: 11.8 MCG/DL (ref 4.5–11.7)
TSH SERPL DL<=0.05 MIU/L-ACNC: 0.13 UIU/ML (ref 0.27–4.2)

## 2021-12-08 PROCEDURE — 84479 ASSAY OF THYROID (T3 OR T4): CPT

## 2021-12-08 PROCEDURE — 36415 COLL VENOUS BLD VENIPUNCTURE: CPT

## 2021-12-08 PROCEDURE — 84436 ASSAY OF TOTAL THYROXINE: CPT

## 2021-12-08 PROCEDURE — 84443 ASSAY THYROID STIM HORMONE: CPT

## 2021-12-08 PROCEDURE — 99213 OFFICE O/P EST LOW 20 MIN: CPT | Performed by: NURSE PRACTITIONER

## 2021-12-08 RX ORDER — LOSARTAN POTASSIUM AND HYDROCHLOROTHIAZIDE 12.5; 1 MG/1; MG/1
1 TABLET ORAL DAILY
Qty: 90 TABLET | Refills: 1 | Status: SHIPPED | OUTPATIENT
Start: 2021-12-08 | End: 2022-06-21

## 2021-12-08 RX ORDER — LEVOTHYROXINE SODIUM 0.15 MG/1
150 TABLET ORAL DAILY
Qty: 90 TABLET | Refills: 1 | Status: SHIPPED | OUTPATIENT
Start: 2021-12-08 | End: 2022-06-21 | Stop reason: SDUPTHER

## 2021-12-08 NOTE — PROGRESS NOTES
"Chief Complaint  Hypothyroidism (Follow up -med refills) and Hypertension    Subjective  Caity is a 56-year-old female here today for follow-up on acquired hypothyroidism.  She is taking levothyroxine 150 mcg once per day.  Was seeing endocrinology in [place] but getting to those appointments can be difficult.  Thyroid panel last checked 1 year ago and would like to have this office monitor thyroid function.  She denies side effects of levothyroxine.  For hypertension she is taking losartan 100 mg with 12.5 mg hydrochlorothiazide once per day.  Sometimes in the summer she will switch to plain losartan as HCTZ makes her sun sensitive.  Reports that blood pressure is under good control and denies concerns today.  Did have some mild elevated cholesterol readings in January.  She is not fasting today.  Would like to recheck cholesterol fasting at some other time in the future.  Does need to have thyroid level checked today however.  Sees gynecology once per year and gets mammograms annually every December.  Had colonoscopy about 2 years ago that was normal.        Caity Mack presents to [facility]    Review of Systems   Constitutional: Negative.    Respiratory: Negative.    Cardiovascular: Negative.    Musculoskeletal: Negative.    Neurological: Negative.    Psychiatric/Behavioral: Negative.         Objective   Vital Signs:   Vitals:    12/08/21 1542   BP: 133/61   BP Location: Left arm   Patient Position: Sitting   Cuff Size: Large Adult   Pulse: 69   SpO2: 98%   Weight: 80.3 kg (177 lb)   Height: 172.7 cm (68\")      Physical Exam  Vitals reviewed.   Constitutional:       General: She is not in acute distress.     Appearance: Normal appearance. She is well-developed.   Neck:      Thyroid: No thyroid mass or thyroid tenderness.   Cardiovascular:      Rate and Rhythm: Normal rate and regular rhythm.      Pulses:           Posterior tibial pulses are 2+ on the right side and " 2+ on the left side.      Heart sounds: Normal heart sounds.   Pulmonary:      Effort: Pulmonary effort is normal.      Breath sounds: Normal breath sounds.   Musculoskeletal:      Right lower leg: No edema.      Left lower leg: No edema.   Skin:     General: Skin is warm and dry.   Neurological:      General: No focal deficit present.      Mental Status: She is alert.   Psychiatric:         Attention and Perception: Attention normal.         Mood and Affect: Mood and affect normal.         Behavior: Behavior normal.          Result Review :                Assessment and Plan    Diagnoses and all orders for this visit:    1. Acquired hypothyroidism (Primary)  -     Thyroid Panel With TSH; Future  -     levothyroxine (SYNTHROID, LEVOTHROID) 150 MCG tablet; Take 1 tablet by mouth Daily.  Dispense: 90 tablet; Refill: 1    2. Essential hypertension  Assessment & Plan:  Monitor blood pressure at home.  Report any elevated readings.  Follow-up in 6 months or sooner if concerns.    Orders:  -     losartan-hydrochlorothiazide (HYZAAR) 100-12.5 MG per tablet; Take 1 tablet by mouth Daily.  Dispense: 90 tablet; Refill: 1      Follow Up    No follow-ups on file.  Patient was given instructions and counseling regarding her condition or for health maintenance advice. Please see specific information pulled into the AVS if appropriate.

## 2021-12-08 NOTE — ASSESSMENT & PLAN NOTE
Monitor blood pressure at home.  Report any elevated readings.  Follow-up in 6 months or sooner if concerns.

## 2021-12-09 NOTE — PROGRESS NOTES
Levothyroxine 150 mcg daily may be a little too strong.  You may hold levothyroxine dose 1 day/week, for example on Sunday.  May repeat a TSH level in 2 or 3 months

## 2022-06-20 ENCOUNTER — LAB (OUTPATIENT)
Dept: LAB | Facility: HOSPITAL | Age: 57
End: 2022-06-20

## 2022-06-20 ENCOUNTER — OFFICE VISIT (OUTPATIENT)
Dept: FAMILY MEDICINE CLINIC | Age: 57
End: 2022-06-20

## 2022-06-20 VITALS
TEMPERATURE: 98.6 F | SYSTOLIC BLOOD PRESSURE: 135 MMHG | HEIGHT: 68 IN | HEART RATE: 65 BPM | BODY MASS INDEX: 26.8 KG/M2 | DIASTOLIC BLOOD PRESSURE: 76 MMHG | WEIGHT: 176.8 LBS

## 2022-06-20 DIAGNOSIS — I10 ESSENTIAL HYPERTENSION: ICD-10-CM

## 2022-06-20 DIAGNOSIS — E03.9 ACQUIRED HYPOTHYROIDISM: ICD-10-CM

## 2022-06-20 DIAGNOSIS — Z00.00 PHYSICAL EXAM: ICD-10-CM

## 2022-06-20 DIAGNOSIS — Z11.59 NEED FOR HEPATITIS C SCREENING TEST: ICD-10-CM

## 2022-06-20 DIAGNOSIS — R00.2 PALPITATIONS: ICD-10-CM

## 2022-06-20 DIAGNOSIS — R42 DIZZINESS: ICD-10-CM

## 2022-06-20 DIAGNOSIS — Z00.00 PHYSICAL EXAM: Primary | ICD-10-CM

## 2022-06-20 LAB
ALBUMIN SERPL-MCNC: 4.4 G/DL (ref 3.5–5.2)
ALBUMIN/GLOB SERPL: 1.5 G/DL
ALP SERPL-CCNC: 96 U/L (ref 39–117)
ALT SERPL W P-5'-P-CCNC: 37 U/L (ref 1–33)
ANION GAP SERPL CALCULATED.3IONS-SCNC: 9.8 MMOL/L (ref 5–15)
AST SERPL-CCNC: 38 U/L (ref 1–32)
BILIRUB SERPL-MCNC: 0.5 MG/DL (ref 0–1.2)
BUN SERPL-MCNC: 13 MG/DL (ref 6–20)
BUN/CREAT SERPL: 14.9 (ref 7–25)
CALCIUM SPEC-SCNC: 10.1 MG/DL (ref 8.6–10.5)
CHLORIDE SERPL-SCNC: 103 MMOL/L (ref 98–107)
CHOLEST SERPL-MCNC: 201 MG/DL (ref 0–200)
CO2 SERPL-SCNC: 27.2 MMOL/L (ref 22–29)
CREAT SERPL-MCNC: 0.87 MG/DL (ref 0.57–1)
DEPRECATED RDW RBC AUTO: 41.2 FL (ref 37–54)
EGFRCR SERPLBLD CKD-EPI 2021: 78.8 ML/MIN/1.73
ERYTHROCYTE [DISTWIDTH] IN BLOOD BY AUTOMATED COUNT: 12.4 % (ref 12.3–15.4)
GLOBULIN UR ELPH-MCNC: 2.9 GM/DL
GLUCOSE SERPL-MCNC: 87 MG/DL (ref 65–99)
HBA1C MFR BLD: 6.1 % (ref 4.8–5.6)
HCT VFR BLD AUTO: 44.1 % (ref 34–46.6)
HCV AB SER DONR QL: NORMAL
HDLC SERPL-MCNC: 44 MG/DL (ref 40–60)
HGB BLD-MCNC: 14.4 G/DL (ref 12–15.9)
LDLC SERPL CALC-MCNC: 133 MG/DL (ref 0–100)
LDLC/HDLC SERPL: 2.96 {RATIO}
MAGNESIUM SERPL-MCNC: 2 MG/DL (ref 1.6–2.6)
MCH RBC QN AUTO: 29.6 PG (ref 26.6–33)
MCHC RBC AUTO-ENTMCNC: 32.7 G/DL (ref 31.5–35.7)
MCV RBC AUTO: 90.7 FL (ref 79–97)
PLATELET # BLD AUTO: 188 10*3/MM3 (ref 140–450)
PMV BLD AUTO: 11.6 FL (ref 6–12)
POTASSIUM SERPL-SCNC: 4.5 MMOL/L (ref 3.5–5.2)
PROT SERPL-MCNC: 7.3 G/DL (ref 6–8.5)
RBC # BLD AUTO: 4.86 10*6/MM3 (ref 3.77–5.28)
SODIUM SERPL-SCNC: 140 MMOL/L (ref 136–145)
T4 FREE SERPL-MCNC: 2 NG/DL (ref 0.93–1.7)
TRIGL SERPL-MCNC: 133 MG/DL (ref 0–150)
TSH SERPL DL<=0.05 MIU/L-ACNC: 1.14 UIU/ML (ref 0.27–4.2)
VLDLC SERPL-MCNC: 24 MG/DL (ref 5–40)
WBC NRBC COR # BLD: 3.53 10*3/MM3 (ref 3.4–10.8)

## 2022-06-20 PROCEDURE — 36415 COLL VENOUS BLD VENIPUNCTURE: CPT

## 2022-06-20 PROCEDURE — 93000 ELECTROCARDIOGRAM COMPLETE: CPT | Performed by: FAMILY MEDICINE

## 2022-06-20 PROCEDURE — 86803 HEPATITIS C AB TEST: CPT

## 2022-06-20 PROCEDURE — 80050 GENERAL HEALTH PANEL: CPT

## 2022-06-20 PROCEDURE — 99396 PREV VISIT EST AGE 40-64: CPT | Performed by: FAMILY MEDICINE

## 2022-06-20 PROCEDURE — 83735 ASSAY OF MAGNESIUM: CPT

## 2022-06-20 PROCEDURE — 84439 ASSAY OF FREE THYROXINE: CPT

## 2022-06-20 PROCEDURE — 83036 HEMOGLOBIN GLYCOSYLATED A1C: CPT

## 2022-06-20 PROCEDURE — 80061 LIPID PANEL: CPT

## 2022-06-20 NOTE — PROGRESS NOTES
Caity Mack presents to [facility].    Chief Complaint:  Wellness exam, blood pressure, thyroid    Subjective       History of Present Illness:  Katja is in today for wellness exam.  She is 56 years old and works for [employer] where she has been for 22+ years.  She does not smoke and never has.  She drinks alcohol rarely, every 2 to 3 months.  She is up to date on mammogram which she has done through her gynecologist.  She is up-to-date on colonoscopy, but she will be due for repeat exam next year.    Katja also has hypertension and hypothyroidism.  Her TSH was just below normal on most recent evaluation, but no change in her thyroid medication was made at that time.  Her blood pressure is above goal today.  She had an unusual situation last week.  She says that her blood pressure was between 145 and 165 roughly.  She did have 1 day last week where her blood pressure was low though, running around 100 systolic.  She says that she did feel lightheaded, like she might pass out that day.  She has noted some occasional episodes of irregular heartbeat.      Review of Systems:  Review of Systems   Constitutional: Negative for chills and fever.   Respiratory: Negative for cough and shortness of breath.    Cardiovascular: Positive for palpitations. Negative for chest pain.   Gastrointestinal: Negative for abdominal pain, nausea and vomiting.   Neurological: Positive for headache.        Objective   Medical History:  Past Medical History:   • Acquired hypothyroidism   • Essential hypertension     Past Surgical History:   • ANKLE SURGERY   • COLONOSCOPY    [place] normal 2018  and 2013- hemorrhoid   • HYSTERECTOMY    Partial      Family History   Problem Relation Age of Onset   • Dementia Mother    • Coronary artery disease Mother    • Hypertension Mother    • Colon cancer Father    • Diabetes type II Father      Social History     Tobacco Use   • Smoking status: Never Smoker   •  "Smokeless tobacco: Never Used   Substance Use Topics   • Alcohol use: Yes     Comment: very rarely       Health Maintenance Due   Topic Date Due   • TDAP/TD VACCINES (1 - Tdap) Never done   • ZOSTER VACCINE (1 of 2) Never done   • HEPATITIS C SCREENING  Never done        Immunization History   Administered Date(s) Administered   • COVID-19 (MODERNA) 1st, 2nd, 3rd Dose Only 02/05/2021, 03/05/2021   • Hepatitis B Vaccine Adult IM 10/08/1999, 12/02/1999, 05/02/2000       No Known Allergies     Medications:  Current Outpatient Medications on File Prior to Visit   Medication Sig   • aspirin 81 MG EC tablet Take 81 mg by mouth Daily.   • levothyroxine (SYNTHROID, LEVOTHROID) 150 MCG tablet Take 1 tablet by mouth Daily.   • losartan-hydrochlorothiazide (HYZAAR) 100-12.5 MG per tablet Take 1 tablet by mouth Daily.     No current facility-administered medications on file prior to visit.       Vital Signs:   /82 (BP Location: Right arm, Patient Position: Sitting)   Pulse 65   Temp 98.6 °F (37 °C) (Oral)   Ht 172.7 cm (67.99\")   Wt 80.2 kg (176 lb 12.8 oz)   BMI 26.89 kg/m²       Physical Exam:  Physical Exam  Vitals and nursing note reviewed.   Constitutional:       General: She is not in acute distress.     Appearance: She is not ill-appearing.   HENT:      Right Ear: Tympanic membrane and ear canal normal.      Left Ear: Tympanic membrane and ear canal normal.      Mouth/Throat:      Mouth: Mucous membranes are moist.      Comments: Pharynx appears normal  Eyes:      Extraocular Movements: Extraocular movements intact.      Pupils: Pupils are equal, round, and reactive to light.   Neck:      Thyroid: No thyromegaly.   Cardiovascular:      Rate and Rhythm: Normal rate and regular rhythm.      Heart sounds: No murmur heard.  Pulmonary:      Effort: Pulmonary effort is normal.      Breath sounds: Normal breath sounds.   Abdominal:      General: There is no distension.      Palpations: Abdomen is soft. There is no " mass.      Tenderness: There is no abdominal tenderness.   Musculoskeletal:      Cervical back: Normal range of motion.   Skin:     Findings: No lesion or rash.   Neurological:      General: No focal deficit present.      Mental Status: She is oriented to person, place, and time.      Cranial Nerves: No cranial nerve deficit.   Psychiatric:         Mood and Affect: Mood normal.         Result Review      The following data was reviewed by Preston Stafford MD on 06/20/2022.  Lab Results   Component Value Date    WBC 5.50 01/07/2021    HGB 15.40 01/07/2021    HCT 44.9 01/07/2021    MCV 89.6 01/07/2021    .00 01/07/2021     Lab Results   Component Value Date    GLUCOSE 90 01/07/2021    BUN 15 01/07/2021    CREATININE 0.87 01/07/2021     01/07/2021    K 3.9 01/07/2021     01/07/2021    CO2 28 01/07/2021    CALCIUM 9.4 01/07/2021    PROTEINTOT 7.6 01/07/2021    ALBUMIN 4.3 01/07/2021    ALT 32 01/07/2021    AST 30 01/07/2021    ALKPHOS 87 01/07/2021    BILITOT 0.40 01/07/2021    GLOB 3.3 01/07/2021    BCR 17 01/07/2021    ANIONGAP 13 01/07/2021      Lab Results   Component Value Date    CHLPL 215 (H) 01/07/2021    TRIG 120 01/07/2021    HDL 48 01/07/2021     (H) 01/07/2021     Lab Results   Component Value Date    TSH 0.131 (L) 12/08/2021       ECG 12 Lead    Date/Time: 6/20/2022 11:03 AM  Performed by: Preston Stafford MD  Authorized by: Preston Stafford MD   Comparison: compared with previous ECG from 5/4/2016  Similar to previous ECG  Rhythm: sinus rhythm  Rate: normal  BPM: 58  Conduction: conduction normal  ST Segments: ST segments normal  T Waves: T waves normal  T inversion: III  QRS axis: normal  Other: no other findings    Clinical impression: normal ECG  Clinical impression comment: This is a normal EKG with nonspecific T wave inversion noted in lead III.  There is been no change from previous tracing.                Assessment and Plan:   Today, we have reviewed  her care.  Katja continues to be in excellent overall health.  However, she is concerned by the blood pressure running high most of the time as well as the episode last week where it was low.  We will do additional laboratory and cardiac evaluation as noted below.  Consider how to move forward from there.  Her medications are reviewed, but I will hold off on refills until we get this testing back, at least the EKG and labs.       Diagnoses and all orders for this visit:    1. Physical exam (Primary)  -     CBC (No Diff); Future  -     Comprehensive Metabolic Panel; Future  -     Lipid Panel; Future  -     Magnesium; Future  -     Hemoglobin A1c; Future  -     TSH+Free T4; Future  -     ECG 12 Lead  -     Hepatitis C Antibody; Future    2. Essential hypertension  -     Comprehensive Metabolic Panel; Future    3. Acquired hypothyroidism  -     TSH+Free T4; Future    4. Dizziness  -     ECG 12 Lead  -     Cardiac Event Monitor; Future    5. Palpitations  -     ECG 12 Lead  -     Cardiac Event Monitor; Future    6. Need for hepatitis C screening test  -     Hepatitis C Antibody; Future          Follow Up   Return in about 1 year (around 6/20/2023) for Recheck.  Patient was given instructions and counseling regarding her condition or for health maintenance advice. Please see specific information pulled into the AVS if appropriate.

## 2022-06-21 RX ORDER — LOSARTAN POTASSIUM 100 MG/1
100 TABLET ORAL
Qty: 90 TABLET | Refills: 3 | Status: SHIPPED | OUTPATIENT
Start: 2022-06-21

## 2022-06-21 RX ORDER — ATORVASTATIN CALCIUM 10 MG/1
10 TABLET, FILM COATED ORAL DAILY
Qty: 90 TABLET | Refills: 3 | Status: SHIPPED | OUTPATIENT
Start: 2022-06-21

## 2022-06-21 RX ORDER — HYDROCHLOROTHIAZIDE 12.5 MG/1
12.5 CAPSULE, GELATIN COATED ORAL DAILY
Qty: 90 CAPSULE | Refills: 3 | Status: SHIPPED | OUTPATIENT
Start: 2022-06-21

## 2022-06-21 RX ORDER — LEVOTHYROXINE SODIUM 0.15 MG/1
150 TABLET ORAL DAILY
Qty: 90 TABLET | Refills: 3 | Status: SHIPPED | OUTPATIENT
Start: 2022-06-21

## 2022-06-21 NOTE — TELEPHONE ENCOUNTER
----- Message from Preston Stafford MD sent at 2022  5:40 AM EDT -----  Please let Katja know the following regarding her testin.  The EKG is normal.  We will be in touch regarding the heart monitor result when it comes back.  2.  The main thyroid function test, TSH, is normal, and I am not advising any change in her medication.  The free T4, a more direct measure of thyroid hormone level, is a little above normal, but this level can vary somewhat and be impacted by other medications.  Again, I am advising no change regarding the thyroid medication.  3.  The A1c level came back at 6.1%  This means that her average blood sugar over the last 90 days has been just above 120.  She does not have type 2 diabetes, but I would recommend limiting intake of concentrated sugars, especially desserts, regular soda, and sweet tea.  I would also advise her to work toward some gradual weight loss if possible.  4.  The liver enzymes, AST and ALT are both just above normal.  This is such a mild elevation, then I would advise no additional evaluation in the near term.  We will continue to monitor this moving forward.  I would generally recommend limiting intake of alcohol and working toward gradual weight loss as above.  5.  Her cholesterol is moderately elevated and similar to where it was last year.  I plugged her numbers into a heart risk calculator that shows her risk of heart attack in the next 10 years is just under 10%.  That is based on age, blood pressure, and health history.  Because of this, I would recommend starting Lipitor 10mg qhs - #90 - 3 refills.  If she is agreeable to starting the medication, then please queue in yesterday's office visit, give usual precautions, and TICKLE for lipid panel, TSH, CMP in 90 days.    6.  I have just sent refills on her medications.  I made no change in the thyroid medication, but because her blood pressure has been up and down a little bit, I have adjusted her blood  pressure medication.  She has been on a combination medication with losartan and HCTZ.  I do not want to make things too complicated, but I would like to have her take losartan at bedtime moving forward and the hydrochlorothiazide in the morning.  I would like her to see how her blood pressure does with this change prior to us considering additional treatments for it.  Because of the change, I have sent separate prescriptions for losartan and hydrochlorothiazide just now.  7.  Please TICKLE to call her in 2 weeks and see how her blood pressure is doing with this change.    Let me know if she has other concerns, and forward her a copy of this testing if she would like.  Thanks.

## 2022-07-05 ENCOUNTER — TELEPHONE (OUTPATIENT)
Dept: FAMILY MEDICINE CLINIC | Age: 57
End: 2022-07-05

## 2022-07-05 NOTE — TELEPHONE ENCOUNTER
----- Message from Debi Camarena LPN sent at 6/21/2022 10:31 AM EDT -----  TICKLE to call her in 2 weeks and see how her blood pressure is doing with this change.

## 2022-07-05 NOTE — TELEPHONE ENCOUNTER
Pt states she hasn't started med yet, she is leaving on vacation and didn't want to start until she got back. States she hasn't checked bp, but feels its better.

## 2022-09-21 ENCOUNTER — TELEPHONE (OUTPATIENT)
Dept: FAMILY MEDICINE CLINIC | Age: 57
End: 2022-09-21

## 2022-09-21 DIAGNOSIS — E78.5 HYPERLIPIDEMIA, UNSPECIFIED HYPERLIPIDEMIA TYPE: ICD-10-CM

## 2022-09-21 DIAGNOSIS — E03.9 ACQUIRED HYPOTHYROIDISM: Primary | ICD-10-CM

## 2022-09-21 NOTE — TELEPHONE ENCOUNTER
----- Message from Debi Camarena LPN sent at 6/21/2022 10:31 AM EDT -----   TICKLE for lipid panel, TSH, CMP in 90 days.

## 2022-10-04 ENCOUNTER — TELEPHONE (OUTPATIENT)
Dept: FAMILY MEDICINE CLINIC | Age: 57
End: 2022-10-04

## 2022-10-07 ENCOUNTER — APPOINTMENT (OUTPATIENT)
Dept: WOMENS IMAGING | Facility: HOSPITAL | Age: 57
End: 2022-10-07

## 2022-10-07 PROCEDURE — 77063 BREAST TOMOSYNTHESIS BI: CPT | Performed by: RADIOLOGY

## 2022-10-07 PROCEDURE — 77067 SCR MAMMO BI INCL CAD: CPT | Performed by: RADIOLOGY

## 2022-11-09 ENCOUNTER — OFFICE VISIT (OUTPATIENT)
Dept: ORTHOPEDIC SURGERY | Facility: CLINIC | Age: 57
End: 2022-11-09

## 2022-11-09 VITALS — WEIGHT: 175 LBS | BODY MASS INDEX: 28.12 KG/M2 | OXYGEN SATURATION: 98 % | HEIGHT: 66 IN | HEART RATE: 72 BPM

## 2022-11-09 DIAGNOSIS — M25.512 LEFT SHOULDER PAIN, UNSPECIFIED CHRONICITY: Primary | ICD-10-CM

## 2022-11-09 DIAGNOSIS — M75.52 ACUTE BURSITIS OF LEFT SHOULDER: ICD-10-CM

## 2022-11-09 DIAGNOSIS — M19.019 OSTEOARTHRITIS OF AC (ACROMIOCLAVICULAR) JOINT: ICD-10-CM

## 2022-11-09 DIAGNOSIS — M77.8 LEFT SHOULDER TENDONITIS: ICD-10-CM

## 2022-11-09 PROCEDURE — 99203 OFFICE O/P NEW LOW 30 MIN: CPT | Performed by: STUDENT IN AN ORGANIZED HEALTH CARE EDUCATION/TRAINING PROGRAM

## 2022-11-09 NOTE — PROGRESS NOTES
"Chief Complaint  Initial Evaluation and Pain of the Left Shoulder    Subjective          Caity Mack presents to [facility] for   History of Present Illness    The patient presents here today for evaluation of the left shoulder. The patient reports left shoulder pain since Monday. She reports she was wheelbearing pumpkins earlier that day. She denies a specific injury. She has been taking Naproxen for the pain with some relief. She reports pain and decreased ROM. She has night pain.     No Known Allergies     Social History     Socioeconomic History   • Marital status:      Spouse name: Js   • Number of children: 3   Tobacco Use   • Smoking status: Never   • Smokeless tobacco: Never   Vaping Use   • Vaping Use: Never used   Substance and Sexual Activity   • Alcohol use: Yes     Comment: very rarely   • Drug use: Never   • Sexual activity: Defer        I reviewed the patient's chief complaint, history of present illness, review of systems, past medical history, surgical history, family history, social history, medications, and allergy list.     REVIEW OF SYSTEMS    Constitutional: Denies fevers, chills, weight loss  Cardiovascular: Denies chest pain, shortness of breath  Skin: Denies rashes, acute skin changes  Neurologic: Denies headache, loss of consciousness  MSK: Left shoulder pain      Objective   Vital Signs:   Pulse 72   Ht 167.6 cm (66\")   Wt 79.4 kg (175 lb)   SpO2 98%   BMI 28.25 kg/m²     Body mass index is 28.25 kg/m².    Physical Exam    General: Alert. No acute distress.   Left shoulder- No skin discoloration, atrophy or swelling. Non-tender to acromioclavicular joint. Tender to the biceps. Forward elevation 120 active, passive 145 with pain. External Rotation 45. Internal rotation waistline. 3/5 supraspinatus strength with pain. 3/5 infraspinatus with pain. 5/5 subscapularis. Positive impingement signs. Pain with speeds. Sensation to light touch " median, radial, ulnar nerve. Positive AIN, PIN, ulnar nerve motor function. Positive pulses.     Procedures    Imaging Results (Most Recent)     Procedure Component Value Units Date/Time    XR Shoulder 2+ View Left [[id number]] Resulted: 11/09/22 1044     Updated: 11/09/22 1045    Narrative:      Indications: Left shoulder pain    Views: AP, Grashey, Scap Y, axillary left shoulder    Findings: No fractures noted.  Mild AC joint arthritic change.    Glenohumeral joint reduced.  Humeral height appropriate.    Comparative Data: No comparative data available                     Assessment and Plan        XR Shoulder 2+ View Left    Result Date: 11/9/2022  Narrative: Indications: Left shoulder pain Views: AP, Grashey, Scap Y, axillary left shoulder Findings: No fractures noted.  Mild AC joint arthritic change.  Glenohumeral joint reduced.  Humeral height appropriate. Comparative Data: No comparative data available        Diagnoses and all orders for this visit:    1. Left shoulder pain, unspecified chronicity (Primary)  -     XR Shoulder 2+ View Left  -     diclofenac (VOLTAREN) 50 MG EC tablet; Take 1 tablet by mouth 2 (Two) Times a Day.  Dispense: 60 tablet; Refill: 0    2. Osteoarthritis of AC (acromioclavicular) joint  -     diclofenac (VOLTAREN) 50 MG EC tablet; Take 1 tablet by mouth 2 (Two) Times a Day.  Dispense: 60 tablet; Refill: 0    3. Acute bursitis of left shoulder  -     diclofenac (VOLTAREN) 50 MG EC tablet; Take 1 tablet by mouth 2 (Two) Times a Day.  Dispense: 60 tablet; Refill: 0    4. Left shoulder tendonitis  -     diclofenac (VOLTAREN) 50 MG EC tablet; Take 1 tablet by mouth 2 (Two) Times a Day.  Dispense: 60 tablet; Refill: 0        Discussed the treatment plan with the patient.  I reviewed the x-rays that were obtained today with the patient. Plan for conservative treatment this time. Home exercises given. Prescription for diclofenac given today. May consider MRI if symptoms persist.     Educated  on risk of smoking. Discussed options for smoking cessation.   Call or return if worsening symptoms.    Scribed for Martín Segura MD by Eloise Kinsey  11/09/2022   09:17 EST         Follow Up       2 weeks.     Patient was given instructions and counseling regarding her condition or for health maintenance advice. Please see specific information pulled into the AVS if appropriate.       I have personally performed the services described in this document as scribed by the above individual and it is both accurate and complete.     Martín Segura MD  11/10/22  07:44 EST

## 2023-01-24 ENCOUNTER — TELEPHONE (OUTPATIENT)
Dept: FAMILY MEDICINE CLINIC | Age: 58
End: 2023-01-24

## 2023-01-24 NOTE — TELEPHONE ENCOUNTER
Caller: Caity Mack    Relationship: Self    Best call back number: [phone]    What form or medical record are you requesting: PROOF OF HAVING TRIGLYCERIDES AND LIPID PANEL CHECKED IN 2022    Who is requesting this form or medical record from you: [employer]    How would you like to receive the form or medical records (pick-up, mail, fax): FAX  [phone]    Timeframe paperwork needed: ASAP

## 2023-08-15 DIAGNOSIS — I10 ESSENTIAL HYPERTENSION: ICD-10-CM

## 2023-08-15 DIAGNOSIS — E03.9 ACQUIRED HYPOTHYROIDISM: ICD-10-CM

## 2023-08-15 RX ORDER — HYDROCHLOROTHIAZIDE 12.5 MG/1
CAPSULE, GELATIN COATED ORAL
Qty: 90 CAPSULE | Refills: 3 | OUTPATIENT
Start: 2023-08-15

## 2023-08-15 RX ORDER — LEVOTHYROXINE SODIUM 0.15 MG/1
TABLET ORAL
Qty: 90 TABLET | Refills: 3 | OUTPATIENT
Start: 2023-08-15

## 2023-08-18 DIAGNOSIS — E03.9 ACQUIRED HYPOTHYROIDISM: ICD-10-CM

## 2023-08-18 DIAGNOSIS — I10 ESSENTIAL HYPERTENSION: ICD-10-CM

## 2023-08-18 RX ORDER — LOSARTAN POTASSIUM 100 MG/1
100 TABLET ORAL
Qty: 90 TABLET | Refills: 0 | Status: SHIPPED | OUTPATIENT
Start: 2023-08-18

## 2023-08-18 RX ORDER — LOSARTAN POTASSIUM 100 MG/1
100 TABLET ORAL
Qty: 90 TABLET | Refills: 3 | OUTPATIENT
Start: 2023-08-18

## 2023-08-18 RX ORDER — LEVOTHYROXINE SODIUM 0.15 MG/1
150 TABLET ORAL DAILY
Qty: 90 TABLET | Refills: 0 | Status: SHIPPED | OUTPATIENT
Start: 2023-08-18

## 2023-08-18 RX ORDER — HYDROCHLOROTHIAZIDE 12.5 MG/1
12.5 CAPSULE, GELATIN COATED ORAL DAILY
Qty: 90 CAPSULE | Refills: 0 | Status: SHIPPED | OUTPATIENT
Start: 2023-08-18

## 2023-08-18 NOTE — TELEPHONE ENCOUNTER
"Caller: Caity Mack \"Katja\"    Relationship: Self    Best call back number: [phone]     Requested Prescriptions:   Requested Prescriptions     Pending Prescriptions Disp Refills    losartan (COZAAR) 100 MG tablet 90 tablet 3     Sig: Take 1 tablet by mouth every night at bedtime.    levothyroxine (SYNTHROID, LEVOTHROID) 150 MCG tablet 90 tablet 3     Sig: Take 1 tablet by mouth Daily.    hydroCHLOROthiazide (MICROZIDE) 12.5 MG capsule 90 capsule 3     Sig: Take 1 capsule by mouth Daily.        Pharmacy where request should be sent: [facility] [place] - [address] [phone] [facility] [phone]      Last office visit with prescribing clinician: 6/20/2022   Last telemedicine visit with prescribing clinician: Visit date not found   Next office visit with prescribing clinician: 8/22/2023     Additional details provided by patient: PATIENT WILL BE OUT OF MEDICATION ON MONDAY    Does the patient have less than a 3 day supply:  [x] Yes  [] No      Kavitha Lozoya Rep   08/18/23 09:22 EDT         "

## 2023-08-22 ENCOUNTER — OFFICE VISIT (OUTPATIENT)
Dept: FAMILY MEDICINE CLINIC | Age: 58
End: 2023-08-22
Payer: COMMERCIAL

## 2023-08-22 ENCOUNTER — LAB (OUTPATIENT)
Dept: LAB | Facility: HOSPITAL | Age: 58
End: 2023-08-22
Payer: COMMERCIAL

## 2023-08-22 VITALS
DIASTOLIC BLOOD PRESSURE: 75 MMHG | SYSTOLIC BLOOD PRESSURE: 138 MMHG | TEMPERATURE: 98.1 F | HEART RATE: 73 BPM | WEIGHT: 177.2 LBS | BODY MASS INDEX: 28.48 KG/M2 | HEIGHT: 66 IN

## 2023-08-22 DIAGNOSIS — Z00.00 PHYSICAL EXAM: Primary | ICD-10-CM

## 2023-08-22 DIAGNOSIS — Z00.00 PHYSICAL EXAM: ICD-10-CM

## 2023-08-22 DIAGNOSIS — I10 ESSENTIAL HYPERTENSION: ICD-10-CM

## 2023-08-22 DIAGNOSIS — E03.9 ACQUIRED HYPOTHYROIDISM: ICD-10-CM

## 2023-08-22 DIAGNOSIS — E78.2 MIXED HYPERLIPIDEMIA: ICD-10-CM

## 2023-08-22 DIAGNOSIS — Z13.6 ENCOUNTER FOR SCREENING FOR CORONARY ARTERY DISEASE: ICD-10-CM

## 2023-08-22 DIAGNOSIS — Z80.0 FH: COLON CANCER: ICD-10-CM

## 2023-08-22 DIAGNOSIS — Z82.49 FH: CORONARY ARTERY DISEASE: ICD-10-CM

## 2023-08-22 DIAGNOSIS — Z12.11 SCREEN FOR COLON CANCER: ICD-10-CM

## 2023-08-22 LAB
ALBUMIN SERPL-MCNC: 4.4 G/DL (ref 3.5–5.2)
ALBUMIN/GLOB SERPL: 1.5 G/DL
ALP SERPL-CCNC: 82 U/L (ref 39–117)
ALT SERPL W P-5'-P-CCNC: 31 U/L (ref 1–33)
ANION GAP SERPL CALCULATED.3IONS-SCNC: 11 MMOL/L (ref 5–15)
AST SERPL-CCNC: 29 U/L (ref 1–32)
BACTERIA UR QL AUTO: ABNORMAL /HPF
BILIRUB SERPL-MCNC: 0.2 MG/DL (ref 0–1.2)
BILIRUB UR QL STRIP: NEGATIVE
BUN SERPL-MCNC: 18 MG/DL (ref 6–20)
BUN/CREAT SERPL: 20.7 (ref 7–25)
CALCIUM SPEC-SCNC: 9.7 MG/DL (ref 8.6–10.5)
CHLORIDE SERPL-SCNC: 102 MMOL/L (ref 98–107)
CHOLEST SERPL-MCNC: 199 MG/DL (ref 0–200)
CLARITY UR: CLEAR
CO2 SERPL-SCNC: 27 MMOL/L (ref 22–29)
COLOR UR: YELLOW
CREAT SERPL-MCNC: 0.87 MG/DL (ref 0.57–1)
DEPRECATED RDW RBC AUTO: 41.4 FL (ref 37–54)
EGFRCR SERPLBLD CKD-EPI 2021: 78.3 ML/MIN/1.73
ERYTHROCYTE [DISTWIDTH] IN BLOOD BY AUTOMATED COUNT: 12.7 % (ref 12.3–15.4)
GLOBULIN UR ELPH-MCNC: 3 GM/DL
GLUCOSE SERPL-MCNC: 89 MG/DL (ref 65–99)
GLUCOSE UR STRIP-MCNC: NEGATIVE MG/DL
HCT VFR BLD AUTO: 43.2 % (ref 34–46.6)
HDLC SERPL-MCNC: 35 MG/DL (ref 40–60)
HGB BLD-MCNC: 14.5 G/DL (ref 12–15.9)
HGB UR QL STRIP.AUTO: NEGATIVE
HYALINE CASTS UR QL AUTO: ABNORMAL /LPF
KETONES UR QL STRIP: NEGATIVE
LDLC SERPL CALC-MCNC: 118 MG/DL (ref 0–100)
LDLC/HDLC SERPL: 3.2 {RATIO}
LEUKOCYTE ESTERASE UR QL STRIP.AUTO: ABNORMAL
MCH RBC QN AUTO: 30.3 PG (ref 26.6–33)
MCHC RBC AUTO-ENTMCNC: 33.6 G/DL (ref 31.5–35.7)
MCV RBC AUTO: 90.4 FL (ref 79–97)
NITRITE UR QL STRIP: NEGATIVE
PH UR STRIP.AUTO: 5.5 [PH] (ref 5–8)
PLATELET # BLD AUTO: 195 10*3/MM3 (ref 140–450)
PMV BLD AUTO: 12 FL (ref 6–12)
POTASSIUM SERPL-SCNC: 4.2 MMOL/L (ref 3.5–5.2)
PROT SERPL-MCNC: 7.4 G/DL (ref 6–8.5)
PROT UR QL STRIP: NEGATIVE
RBC # BLD AUTO: 4.78 10*6/MM3 (ref 3.77–5.28)
RBC # UR STRIP: ABNORMAL /HPF
REF LAB TEST METHOD: ABNORMAL
SODIUM SERPL-SCNC: 140 MMOL/L (ref 136–145)
SP GR UR STRIP: 1.02 (ref 1–1.03)
SQUAMOUS #/AREA URNS HPF: ABNORMAL /HPF
TRIGL SERPL-MCNC: 260 MG/DL (ref 0–150)
TSH SERPL DL<=0.05 MIU/L-ACNC: 0.07 UIU/ML (ref 0.27–4.2)
UROBILINOGEN UR QL STRIP: ABNORMAL
VLDLC SERPL-MCNC: 46 MG/DL (ref 5–40)
WBC # UR STRIP: ABNORMAL /HPF
WBC NRBC COR # BLD: 4.83 10*3/MM3 (ref 3.4–10.8)

## 2023-08-22 PROCEDURE — 80061 LIPID PANEL: CPT

## 2023-08-22 PROCEDURE — 83036 HEMOGLOBIN GLYCOSYLATED A1C: CPT

## 2023-08-22 PROCEDURE — 99396 PREV VISIT EST AGE 40-64: CPT | Performed by: FAMILY MEDICINE

## 2023-08-22 PROCEDURE — 36415 COLL VENOUS BLD VENIPUNCTURE: CPT

## 2023-08-22 PROCEDURE — 80050 GENERAL HEALTH PANEL: CPT

## 2023-08-22 PROCEDURE — 81001 URINALYSIS AUTO W/SCOPE: CPT

## 2023-08-22 RX ORDER — LOSARTAN POTASSIUM 100 MG/1
100 TABLET ORAL
Qty: 90 TABLET | Refills: 3 | Status: SHIPPED | OUTPATIENT
Start: 2023-08-22

## 2023-08-22 RX ORDER — LEVOTHYROXINE SODIUM 0.15 MG/1
150 TABLET ORAL DAILY
Qty: 90 TABLET | Refills: 3 | Status: SHIPPED | OUTPATIENT
Start: 2023-08-22 | End: 2023-08-23 | Stop reason: SDUPTHER

## 2023-08-22 RX ORDER — HYDROCHLOROTHIAZIDE 12.5 MG/1
12.5 CAPSULE, GELATIN COATED ORAL DAILY
Qty: 90 CAPSULE | Refills: 3 | Status: SHIPPED | OUTPATIENT
Start: 2023-08-22

## 2023-08-22 NOTE — PROGRESS NOTES
Caity Mack presents to [facility].    Chief Complaint:  Annual physical, medication refills    Subjective        History of Present Illness:  Katja is in today for wellness exam.  She is 57 years old and works for [employer] where she has been for 23+ years.  She does not smoke and never has.  She drinks alcohol rarely, every 2 to 3 months.  She is up to date on mammogram which she has done through her gynecologist.  She is due for colonoscopy due to family history of colon cancer.    Katja also has hypertension for which she remains on losartan and hydrochlorothiazide as noted.  Her blood pressure is reasonable today.  She says that her blood pressure can run higher at home.    In addition, she has hypothyroidism and remains on levothyroxine 150 mcg daily.  She is due for recheck on it.  She is unaware of obvious side effects from these medications.    Review of Systems:  Review of Systems   Constitutional:  Negative for chills and fever.   Respiratory:  Negative for cough and shortness of breath.    Cardiovascular:  Negative for chest pain and palpitations.   Gastrointestinal:  Negative for abdominal pain, nausea and vomiting.      Objective   Medical History:  Past Medical History:    Acquired hypothyroidism    Essential hypertension     Past Surgical History:    ANKLE SURGERY    COLONOSCOPY    flaget normal 2018  and 2013- hemorrhoid    HYSTERECTOMY    Partial      Family History   Problem Relation Age of Onset    Dementia Mother     Coronary artery disease Mother     Hypertension Mother     Colon cancer Father     Diabetes type II Father      Social History     Tobacco Use    Smoking status: Never    Smokeless tobacco: Never   Substance Use Topics    Alcohol use: Yes     Comment: very rarely       Health Maintenance Due   Topic Date Due    TDAP/TD VACCINES (1 - Tdap) Never done    ZOSTER VACCINE (1 of 2) Never done    MAMMOGRAM  12/28/2022    COLORECTAL CANCER  "SCREENING  05/21/2023    LIPID PANEL  06/20/2023        Immunization History   Administered Date(s) Administered    COVID-19 (MODERNA) 1st,2nd,3rd Dose Monovalent 02/05/2021, 03/05/2021    Hepatitis B 10/08/1999, 12/02/1999, 05/02/2000       No Known Allergies     Medications:  Current Outpatient Medications on File Prior to Visit   Medication Sig    aspirin 81 MG EC tablet Take 1 tablet by mouth Daily.    [DISCONTINUED] hydroCHLOROthiazide (MICROZIDE) 12.5 MG capsule Take 1 capsule by mouth Daily.    [DISCONTINUED] levothyroxine (SYNTHROID, LEVOTHROID) 150 MCG tablet Take 1 tablet by mouth Daily.    [DISCONTINUED] losartan (COZAAR) 100 MG tablet Take 1 tablet by mouth every night at bedtime.    [DISCONTINUED] atorvastatin (LIPITOR) 10 MG tablet Take 1 tablet by mouth Daily. (Patient not taking: Reported on 8/22/2023)    [DISCONTINUED] diclofenac (VOLTAREN) 50 MG EC tablet Take 1 tablet by mouth 2 (Two) Times a Day.    [DISCONTINUED] losartan-hydrochlorothiazide (HYZAAR) 100-12.5 MG per tablet Take 1 tablet by mouth Daily.     No current facility-administered medications on file prior to visit.       Vital Signs:   /75 (BP Location: Right arm, Patient Position: Sitting)   Pulse 73   Temp 98.1 øF (36.7 øC) (Oral)   Ht 167.6 cm (65.98\")   Wt 80.4 kg (177 lb 3.2 oz)   BMI 28.61 kg/mý       Physical Exam:  Physical Exam  Vitals and nursing note reviewed.   Constitutional:       General: She is not in acute distress.     Appearance: She is not ill-appearing.   HENT:      Right Ear: Tympanic membrane and ear canal normal.      Left Ear: Tympanic membrane and ear canal normal.      Mouth/Throat:      Mouth: Mucous membranes are moist.      Comments: Pharynx appears normal  Eyes:      Extraocular Movements: Extraocular movements intact.      Pupils: Pupils are equal, round, and reactive to light.   Neck:      Thyroid: No thyromegaly.   Cardiovascular:      Rate and Rhythm: Normal rate and regular rhythm.      " Heart sounds: No murmur heard.  Pulmonary:      Effort: Pulmonary effort is normal.      Breath sounds: Normal breath sounds.   Abdominal:      General: There is no distension.      Palpations: Abdomen is soft. There is no mass.      Tenderness: There is no abdominal tenderness.   Musculoskeletal:      Cervical back: Normal range of motion.   Skin:     Findings: No lesion or rash.   Neurological:      General: No focal deficit present.      Mental Status: She is oriented to person, place, and time.      Cranial Nerves: No cranial nerve deficit.   Psychiatric:         Mood and Affect: Mood normal.       Result Review      The following data was reviewed by Preston Stafford MD on 08/22/2023.  Lab Results   Component Value Date    WBC 3.53 06/20/2022    HGB 14.4 06/20/2022    HCT 44.1 06/20/2022    MCV 90.7 06/20/2022     06/20/2022     Lab Results   Component Value Date    GLUCOSE 87 06/20/2022    BUN 13 06/20/2022    CREATININE 0.87 06/20/2022     06/20/2022    K 4.5 06/20/2022     06/20/2022    CO2 27.2 06/20/2022    CALCIUM 10.1 06/20/2022    PROTEINTOT 7.3 06/20/2022    ALBUMIN 4.40 06/20/2022    ALT 37 (H) 06/20/2022    AST 38 (H) 06/20/2022    ALKPHOS 96 06/20/2022    BILITOT 0.5 06/20/2022    EGFR 78.8 06/20/2022    GLOB 2.9 06/20/2022    AGRATIO 1.5 06/20/2022    BCR 14.9 06/20/2022    ANIONGAP 9.8 06/20/2022      Lab Results   Component Value Date    CHOL 201 (H) 06/20/2022    CHLPL 215 (H) 01/07/2021    TRIG 133 06/20/2022    HDL 44 06/20/2022     (H) 06/20/2022     Lab Results   Component Value Date    TSH 1.140 06/20/2022     Lab Results   Component Value Date    HGBA1C 6.10 (H) 06/20/2022            Assessment and Plan:   Today, we have reviewed her care.  Katja continues to be in excellent overall health.  Her blood pressure is a little borderline here, but reasonable.  She is concerned that it seems higher at home.  For the near term, we will refill her current medications  and update labs as noted below.  I am likely to reach out to her in about a month and asked her to stop in with her home cuff for comparison sake.  She is also due for colonoscopy, and referral will be made for that.  Tentative follow-up will be again in about a year.       Diagnoses and all orders for this visit:    1. Physical (Primary)  -     CBC (No Diff); Future  -     Comprehensive Metabolic Panel; Future  -     Lipid Panel; Future  -     TSH; Future  -     Hemoglobin A1c; Future  -     Urinalysis With Microscopic - Urine, Clean Catch; Future    2. Essential hypertension  -     Comprehensive Metabolic Panel; Future  -     hydroCHLOROthiazide (MICROZIDE) 12.5 MG capsule; Take 1 capsule by mouth Daily.  Dispense: 90 capsule; Refill: 3  -     losartan (COZAAR) 100 MG tablet; Take 1 tablet by mouth every night at bedtime.  Dispense: 90 tablet; Refill: 3    3. Acquired hypothyroidism  -     TSH; Future  -     levothyroxine (SYNTHROID, LEVOTHROID) 150 MCG tablet; Take 1 tablet by mouth Daily.  Dispense: 90 tablet; Refill: 3    4. Mixed hyperlipidemia  -     Comprehensive Metabolic Panel; Future  -     Lipid Panel; Future    5. Screen for colon cancer  -     Ambulatory Referral to General Surgery    6. FH: colon cancer  -     Ambulatory Referral to General Surgery    Follow Up   Return in about 1 year (around 8/22/2024) for Recheck, Annual physical.  Patient was given instructions and counseling regarding her condition or for health maintenance advice. Please see specific information pulled into the AVS if appropriate.

## 2023-08-23 LAB — HBA1C MFR BLD: 5.6 % (ref 4.8–5.6)

## 2023-08-23 RX ORDER — LEVOTHYROXINE SODIUM 137 UG/1
137 TABLET ORAL DAILY
Qty: 90 TABLET | Refills: 3 | Status: SHIPPED | OUTPATIENT
Start: 2023-08-23

## 2023-11-07 ENCOUNTER — TELEPHONE (OUTPATIENT)
Dept: FAMILY MEDICINE CLINIC | Age: 58
End: 2023-11-07
Payer: COMMERCIAL

## 2023-12-20 ENCOUNTER — HOSPITAL ENCOUNTER (OUTPATIENT)
Dept: CT IMAGING | Facility: HOSPITAL | Age: 58
Discharge: HOME | End: 2023-12-20
Admitting: FAMILY MEDICINE
Payer: COMMERCIAL

## 2023-12-20 DIAGNOSIS — Z82.49 FH: CORONARY ARTERY DISEASE: ICD-10-CM

## 2023-12-20 DIAGNOSIS — E78.2 MIXED HYPERLIPIDEMIA: ICD-10-CM

## 2023-12-20 DIAGNOSIS — I10 ESSENTIAL HYPERTENSION: ICD-10-CM

## 2023-12-20 DIAGNOSIS — Z13.6 ENCOUNTER FOR SCREENING FOR CORONARY ARTERY DISEASE: ICD-10-CM

## 2023-12-20 PROCEDURE — 75571 CT HRT W/O DYE W/CA TEST: CPT

## 2024-06-18 ENCOUNTER — TELEPHONE (OUTPATIENT)
Dept: FAMILY MEDICINE CLINIC | Age: 59
End: 2024-06-18
Payer: COMMERCIAL

## 2024-06-18 DIAGNOSIS — I10 ESSENTIAL HYPERTENSION: Primary | ICD-10-CM

## 2024-06-18 NOTE — TELEPHONE ENCOUNTER
"  Caller: Caity Mack \"Katja\"    Relationship: Self    Best call back number: [phone]    What is the best time to lucy     What was the call regarding:     THE PATIENT SAID HE BLOOD PRESSURE MEDICATION     losartan (COZAAR) 100 MG tablet     IS MAKING HER SUN SENSITIVE.  SHE  IS WANTING  PCP JASMIN TO CHANGE IT TO SOMETHING ELSE        [facility] - [place], [place] - [address] [facility] [phone] [facility] [phone]  [phone]   "

## 2024-06-19 RX ORDER — AMLODIPINE BESYLATE 5 MG/1
5 TABLET ORAL
Qty: 90 TABLET | Refills: 0 | Status: SHIPPED | OUTPATIENT
Start: 2024-06-19

## 2024-06-19 NOTE — TELEPHONE ENCOUNTER
Noted.  This can potentially be an issue with many different medications.  I think it is reasonable to make a change, but my general preference is for patients to be careful about sun exposure.  Having said that, I have sent a prescription for amlodipine.  This is a different class of blood pressure medication.  I would recommend she take it roughly at bedtime.  TICKLE for blood pressure check in 3 weeks.  Otherwise, we can discuss further at her follow-up visit in August.  Thanks.

## 2024-07-10 ENCOUNTER — CLINICAL SUPPORT (OUTPATIENT)
Dept: FAMILY MEDICINE CLINIC | Age: 59
End: 2024-07-10
Payer: COMMERCIAL

## 2024-07-10 ENCOUNTER — TELEPHONE (OUTPATIENT)
Dept: FAMILY MEDICINE CLINIC | Age: 59
End: 2024-07-10
Payer: COMMERCIAL

## 2024-07-10 VITALS — DIASTOLIC BLOOD PRESSURE: 93 MMHG | HEART RATE: 57 BPM | SYSTOLIC BLOOD PRESSURE: 159 MMHG

## 2024-07-10 DIAGNOSIS — I10 ESSENTIAL HYPERTENSION: Primary | ICD-10-CM

## 2024-07-10 NOTE — TELEPHONE ENCOUNTER
----- Message from Debi SPEARS sent at 6/19/2024  9:51 AM EDT -----  TICKLE for blood pressure check in 3 weeks.

## 2024-07-11 NOTE — PROGRESS NOTES
Vitals:    07/10/24 1132 07/10/24 1140   BP: 159/78 159/93   BP Location: Left arm Right arm   Patient Position: Sitting Sitting   Cuff Size: Adult Adult   Pulse: 53 57     Her blood pressure is too high.  Confirm with her if she has noted a difference regarding sun sensitivity since stopping losartan.  It remains an open question how to move forward.  Thanks.

## 2024-07-11 NOTE — PROGRESS NOTES
Pt called back to state after she got to thinking, she really hasn't been in the sun much. Today she will be outside a lot, so she will be able to tell after today if that is accurate.

## 2024-07-12 RX ORDER — AMLODIPINE BESYLATE 5 MG/1
10 TABLET ORAL
Start: 2024-07-12

## 2024-07-12 NOTE — PROGRESS NOTES
Noted.  If she is doing well with the amlodipine, I would recommend she increase it to 2 tablets nightly for a total dose of 10 mg.  TICKLE to call her again in 2 weeks for a follow-up blood pressure check.  I will try to work with the medicines that she is currently taking prior to considering adding a different class.  Thanks.

## 2024-07-16 ENCOUNTER — TELEPHONE (OUTPATIENT)
Dept: FAMILY MEDICINE CLINIC | Age: 59
End: 2024-07-16
Payer: COMMERCIAL

## 2024-07-16 NOTE — TELEPHONE ENCOUNTER
"  Caller: Caity Mack \"Katja\"    Relationship: Self    Best call back number: [phone]     What is the best time to reach you: ANYTIME    Who are you requesting to speak with (clinical staff, provider,  specific staff member): PASCALE SHAHID    What was the call regarding: PATIENT HAS SOME QUESTIONS ABOUT MEDICATION AMLODIPINE.    Is it okay if the provider responds through MyChart: NO, CALL PREFERRED MAY LEAVE VOICEMAIL.    "

## 2024-07-26 ENCOUNTER — TELEPHONE (OUTPATIENT)
Dept: FAMILY MEDICINE CLINIC | Age: 59
End: 2024-07-26
Payer: COMMERCIAL

## 2024-07-26 NOTE — TELEPHONE ENCOUNTER
----- Message from Debi SPEARS sent at 7/12/2024  9:42 AM EDT -----   TICKLE to call her again in 2 weeks for a follow-up blood pressure check.

## 2024-07-29 ENCOUNTER — TELEPHONE (OUTPATIENT)
Dept: FAMILY MEDICINE CLINIC | Age: 59
End: 2024-07-29

## 2024-07-29 ENCOUNTER — CLINICAL SUPPORT (OUTPATIENT)
Dept: FAMILY MEDICINE CLINIC | Age: 59
End: 2024-07-29
Payer: COMMERCIAL

## 2024-07-29 VITALS — DIASTOLIC BLOOD PRESSURE: 90 MMHG | HEART RATE: 66 BPM | SYSTOLIC BLOOD PRESSURE: 152 MMHG

## 2024-07-29 DIAGNOSIS — I10 ESSENTIAL HYPERTENSION: Primary | ICD-10-CM

## 2024-07-29 NOTE — TELEPHONE ENCOUNTER
Pt states she is having issues with the amlodipine, she is having leg/feet swelling and headaches. She is taking med at bedtime. She would like to change to different med. Please advise.

## 2024-08-05 RX ORDER — NIFEDIPINE 30 MG/1
30 TABLET, EXTENDED RELEASE ORAL
Qty: 30 TABLET | Refills: 0 | Status: SHIPPED | OUTPATIENT
Start: 2024-08-05

## 2024-08-05 NOTE — PROGRESS NOTES
Vitals:    07/29/24 0845   BP: 152/90   BP Location: Left arm   Patient Position: Sitting   Pulse: 66     Please see telephone note regarding blood pressure medication.  Thanks.

## 2024-08-05 NOTE — TELEPHONE ENCOUNTER
Noted.  I have sent a prescription for extended release nifedipine to [facility] just now.  It is in a similar class of medication as amlodipine, but it does not typically cause this much swelling.  I am hopeful that she will tolerate it better.  I would recommend she continue this and hydrochlorothiazide until her follow-up visit on 8/23/2024.  Let me know if she has other concerns.  Thanks.

## 2024-08-23 ENCOUNTER — OFFICE VISIT (OUTPATIENT)
Dept: FAMILY MEDICINE CLINIC | Age: 59
End: 2024-08-23
Payer: COMMERCIAL

## 2024-08-23 ENCOUNTER — LAB (OUTPATIENT)
Dept: LAB | Facility: HOSPITAL | Age: 59
End: 2024-08-23
Payer: COMMERCIAL

## 2024-08-23 VITALS
BODY MASS INDEX: 26.61 KG/M2 | TEMPERATURE: 97.7 F | OXYGEN SATURATION: 97 % | HEIGHT: 66 IN | DIASTOLIC BLOOD PRESSURE: 75 MMHG | WEIGHT: 165.6 LBS | HEART RATE: 57 BPM | SYSTOLIC BLOOD PRESSURE: 138 MMHG

## 2024-08-23 DIAGNOSIS — E03.9 ACQUIRED HYPOTHYROIDISM: ICD-10-CM

## 2024-08-23 DIAGNOSIS — I10 ESSENTIAL HYPERTENSION: ICD-10-CM

## 2024-08-23 DIAGNOSIS — Z00.00 PHYSICAL EXAM: ICD-10-CM

## 2024-08-23 DIAGNOSIS — Z00.00 PHYSICAL EXAM: Primary | ICD-10-CM

## 2024-08-23 LAB
ALBUMIN SERPL-MCNC: 4.6 G/DL (ref 3.5–5.2)
ALBUMIN/GLOB SERPL: 1.8 G/DL
ALP SERPL-CCNC: 85 U/L (ref 39–117)
ALT SERPL W P-5'-P-CCNC: 18 U/L (ref 1–33)
ANION GAP SERPL CALCULATED.3IONS-SCNC: 11 MMOL/L (ref 5–15)
AST SERPL-CCNC: 23 U/L (ref 1–32)
BILIRUB SERPL-MCNC: 0.3 MG/DL (ref 0–1.2)
BUN SERPL-MCNC: 27 MG/DL (ref 6–20)
BUN/CREAT SERPL: 28.7 (ref 7–25)
CALCIUM SPEC-SCNC: 10.1 MG/DL (ref 8.6–10.5)
CHLORIDE SERPL-SCNC: 102 MMOL/L (ref 98–107)
CHOLEST SERPL-MCNC: 197 MG/DL (ref 0–200)
CO2 SERPL-SCNC: 29 MMOL/L (ref 22–29)
CREAT SERPL-MCNC: 0.94 MG/DL (ref 0.57–1)
DEPRECATED RDW RBC AUTO: 43.3 FL (ref 37–54)
EGFRCR SERPLBLD CKD-EPI 2021: 70.9 ML/MIN/1.73
ERYTHROCYTE [DISTWIDTH] IN BLOOD BY AUTOMATED COUNT: 12.6 % (ref 12.3–15.4)
GLOBULIN UR ELPH-MCNC: 2.5 GM/DL
GLUCOSE SERPL-MCNC: 89 MG/DL (ref 65–99)
HBA1C MFR BLD: 5.7 % (ref 4.8–5.6)
HCT VFR BLD AUTO: 43.1 % (ref 34–46.6)
HDLC SERPL-MCNC: 35 MG/DL (ref 40–60)
HGB BLD-MCNC: 14 G/DL (ref 12–15.9)
LDLC SERPL CALC-MCNC: 121 MG/DL (ref 0–100)
LDLC/HDLC SERPL: 3.31 {RATIO}
MCH RBC QN AUTO: 29.9 PG (ref 26.6–33)
MCHC RBC AUTO-ENTMCNC: 32.5 G/DL (ref 31.5–35.7)
MCV RBC AUTO: 92.1 FL (ref 79–97)
PLATELET # BLD AUTO: 197 10*3/MM3 (ref 140–450)
PMV BLD AUTO: 10.3 FL (ref 6–12)
POTASSIUM SERPL-SCNC: 4.1 MMOL/L (ref 3.5–5.2)
PROT SERPL-MCNC: 7.1 G/DL (ref 6–8.5)
RBC # BLD AUTO: 4.68 10*6/MM3 (ref 3.77–5.28)
SODIUM SERPL-SCNC: 142 MMOL/L (ref 136–145)
TRIGL SERPL-MCNC: 231 MG/DL (ref 0–150)
TSH SERPL DL<=0.05 MIU/L-ACNC: 0.8 UIU/ML (ref 0.27–4.2)
VLDLC SERPL-MCNC: 41 MG/DL (ref 5–40)
WBC NRBC COR # BLD AUTO: 4.92 10*3/MM3 (ref 3.4–10.8)

## 2024-08-23 PROCEDURE — 99396 PREV VISIT EST AGE 40-64: CPT | Performed by: FAMILY MEDICINE

## 2024-08-23 PROCEDURE — 80050 GENERAL HEALTH PANEL: CPT

## 2024-08-23 PROCEDURE — 83036 HEMOGLOBIN GLYCOSYLATED A1C: CPT

## 2024-08-23 PROCEDURE — 36415 COLL VENOUS BLD VENIPUNCTURE: CPT

## 2024-08-23 PROCEDURE — 80061 LIPID PANEL: CPT

## 2024-08-23 RX ORDER — LEVOTHYROXINE SODIUM 137 UG/1
137 TABLET ORAL DAILY
Qty: 90 TABLET | Refills: 3 | Status: SHIPPED | OUTPATIENT
Start: 2024-08-23

## 2024-08-23 RX ORDER — HYDROCHLOROTHIAZIDE 12.5 MG/1
12.5 CAPSULE, GELATIN COATED ORAL DAILY
Qty: 90 CAPSULE | Refills: 3 | Status: SHIPPED | OUTPATIENT
Start: 2024-08-23

## 2024-08-23 RX ORDER — LOSARTAN POTASSIUM 100 MG/1
100 TABLET ORAL DAILY
Qty: 90 TABLET | Refills: 3 | Status: SHIPPED | OUTPATIENT
Start: 2024-08-23

## 2024-08-23 RX ORDER — LANOLIN ALCOHOL/MO/W.PET/CERES
3 CREAM (GRAM) TOPICAL NIGHTLY PRN
COMMUNITY

## 2024-08-23 NOTE — PROGRESS NOTES
Caity Mack presents to [facility].    Chief Complaint:  Annual physical    Subjective   History of Present Illness:  Katja is in today for wellness exam.  She is 58 years old and works for [employer] where she has been for 24+ years.  She does not smoke and never has.  She drinks alcohol rarely, every 3-4 months.  She is up to date on mammogram which she has done through her gynecologist.  She is up-to-date on colonoscopy which was completed in December of last year.  It was essentially normal.  Hyperplastic polyp was present.     Katja also has hypertension for which she remains on losartan and hydrochlorothiazide as noted.  Her blood pressure is just above goal on initial check.  She did have recent issues with losartan contributing to sun sensitivity.  She stopped it for a period of time.  She was prescribed amlodipine and then nifedipine.  However, both of those medicines caused significant edema for her.     In addition, she has hypothyroidism and remains on levothyroxine 150 mcg daily.  She is due for recheck on it.  She is unaware of obvious side effects from these medications.    Review of Systems:  Review of Systems   Constitutional:  Positive for fatigue. Negative for chills and fever.   Respiratory:  Negative for cough and shortness of breath.    Cardiovascular:  Negative for chest pain and palpitations.   Gastrointestinal:  Negative for abdominal pain, nausea and vomiting.      Objective   Medical History:  Past Medical History:    Acquired hypothyroidism    Essential hypertension     Past Surgical History:    ANKLE SURGERY    COLONOSCOPY    Hyperplastic polyp, hemorrhoids    HYSTERECTOMY    Partial      Family History   Problem Relation Age of Onset    Dementia Mother     Coronary artery disease Mother     Hypertension Mother     Colon cancer Father     Diabetes type II Father      Social History     Tobacco Use    Smoking status: Never    Smokeless  "tobacco: Never   Substance Use Topics    Alcohol use: Yes     Comment: very rarely       Health Maintenance Due   Topic Date Due    TDAP/TD VACCINES (1 - Tdap) Never done    ZOSTER VACCINE (1 of 2) Never done    LIPID PANEL  08/22/2024    INFLUENZA VACCINE  08/01/2024    MAMMOGRAM  10/07/2024        Immunization History   Administered Date(s) Administered    COVID-19 (MODERNA) 1st,2nd,3rd Dose Monovalent 02/05/2021, 03/05/2021    Hepatitis B 10/08/1999, 12/02/1999, 05/02/2000       Allergies   Allergen Reactions    Amlodipine Swelling    Nifedipine Swelling        Medications:  Current Outpatient Medications on File Prior to Visit   Medication Sig    aspirin 81 MG EC tablet Take 1 tablet by mouth Daily.    melatonin 3 MG tablet Take 1 tablet by mouth At Night As Needed.    [DISCONTINUED] hydroCHLOROthiazide (MICROZIDE) 12.5 MG capsule Take 1 capsule by mouth Daily.    [DISCONTINUED] levothyroxine (SYNTHROID, LEVOTHROID) 137 MCG tablet Take 1 tablet by mouth Daily.    [DISCONTINUED] losartan-hydrochlorothiazide (HYZAAR) 100-12.5 MG per tablet Take 1 tablet by mouth Daily.    [DISCONTINUED] NIFEdipine XL (PROCARDIA XL) 30 MG 24 hr tablet Take 1 tablet by mouth every night at bedtime. (Patient not taking: Reported on 8/23/2024)     No current facility-administered medications on file prior to visit.       Vital Signs:   Vitals:    08/23/24 1518 08/23/24 1544   BP: 149/78 138/75   BP Location: Right arm Right arm   Patient Position: Sitting Sitting   Pulse: 53 57   Temp: 97.7 °F (36.5 °C)    TempSrc: Oral    SpO2: 97%    Weight: 75.1 kg (165 lb 9.6 oz)    Height: 167.6 cm (65.98\")    Body mass index is 26.74 kg/m².    Physical Exam:  Physical Exam  Vitals and nursing note reviewed.   Constitutional:       General: She is not in acute distress.     Appearance: She is not ill-appearing.   HENT:      Right Ear: Tympanic membrane and ear canal normal.      Left Ear: Tympanic membrane and ear canal normal.      Mouth/Throat: "      Mouth: Mucous membranes are moist.      Comments: Pharynx appears normal  Eyes:      Extraocular Movements: Extraocular movements intact.      Pupils: Pupils are equal, round, and reactive to light.   Neck:      Thyroid: No thyromegaly.   Cardiovascular:      Rate and Rhythm: Normal rate and regular rhythm.      Heart sounds: No murmur heard.  Pulmonary:      Effort: Pulmonary effort is normal.      Breath sounds: Normal breath sounds.   Abdominal:      General: There is no distension.      Palpations: Abdomen is soft. There is no mass.      Tenderness: There is no abdominal tenderness.   Musculoskeletal:      Cervical back: Normal range of motion.   Skin:     Findings: No lesion or rash.   Neurological:      General: No focal deficit present.      Mental Status: She is oriented to person, place, and time.      Cranial Nerves: No cranial nerve deficit.   Psychiatric:         Mood and Affect: Mood normal.       Result Review   The following data was reviewed by Preston Stafford MD on 08/23/2024.  Lab Results   Component Value Date    WBC 4.83 08/22/2023    HGB 14.5 08/22/2023    HCT 43.2 08/22/2023    MCV 90.4 08/22/2023     08/22/2023     Lab Results   Component Value Date    GLUCOSE 89 08/22/2023    BUN 18 08/22/2023    CREATININE 0.87 08/22/2023     08/22/2023    K 4.2 08/22/2023     08/22/2023    CO2 27.0 08/22/2023    CALCIUM 9.7 08/22/2023    PROTEINTOT 7.4 08/22/2023    ALBUMIN 4.4 08/22/2023    ALT 31 08/22/2023    AST 29 08/22/2023    ALKPHOS 82 08/22/2023    BILITOT 0.2 08/22/2023    EGFR 78.3 08/22/2023    GLOB 3.0 08/22/2023    AGRATIO 1.5 08/22/2023    BCR 20.7 08/22/2023    ANIONGAP 11.0 08/22/2023      Lab Results   Component Value Date    CHOL 199 08/22/2023    CHLPL 215 (H) 01/07/2021    TRIG 260 (H) 08/22/2023    HDL 35 (L) 08/22/2023     (H) 08/22/2023     Lab Results   Component Value Date    TSH 0.071 (L) 08/22/2023     Lab Results   Component Value Date     HGBA1C 5.60 08/22/2023          Assessment and Plan:   Today, we have reviewed her care.  Katja continues to be in excellent overall health.  She is currently up-to-date on recommended cancer screenings.  We will reach out to gynecology for a copy of her most recent mammogram.  Regarding her usual care, we will continue her current medications now.  Her blood pressure is mildly elevated, and it will be rechecked before she leaves.  It remains an open question whether to consider additional treatment for hypertension.  We will see what her testing shows and then reach back out to her.  I have flagged amlodipine and nifedipine so that we do not give those medications to her moving forward.    Diagnoses and all orders for this visit:    1. Physical exam (Primary)  -     CBC (No Diff); Future  -     Comprehensive Metabolic Panel; Future  -     Lipid Panel; Future  -     TSH; Future  -     Hemoglobin A1c; Future    2. Essential hypertension  -     Comprehensive Metabolic Panel; Future  -     hydroCHLOROthiazide (MICROZIDE) 12.5 MG capsule; Take 1 capsule by mouth Daily.  Dispense: 90 capsule; Refill: 3  -     losartan (COZAAR) 100 MG tablet; Take 1 tablet by mouth Daily.  Dispense: 90 tablet; Refill: 3    3. Acquired hypothyroidism  -     TSH; Future  -     levothyroxine (SYNTHROID, LEVOTHROID) 137 MCG tablet; Take 1 tablet by mouth Daily.  Dispense: 90 tablet; Refill: 3    Follow Up  Return in about 1 year (around 8/23/2025) for Recheck, Next scheduled follow up, Annual physical.  Patient was given instructions and counseling regarding her condition or for health maintenance advice. Please see specific information pulled into the AVS if appropriate.

## 2025-08-25 ENCOUNTER — OFFICE VISIT (OUTPATIENT)
Dept: FAMILY MEDICINE CLINIC | Age: 60
End: 2025-08-25
Payer: COMMERCIAL

## 2025-08-25 VITALS
OXYGEN SATURATION: 94 % | WEIGHT: 177.4 LBS | SYSTOLIC BLOOD PRESSURE: 139 MMHG | TEMPERATURE: 98.3 F | BODY MASS INDEX: 28.51 KG/M2 | HEIGHT: 66 IN | DIASTOLIC BLOOD PRESSURE: 86 MMHG | HEART RATE: 59 BPM

## 2025-08-25 DIAGNOSIS — E78.2 MIXED HYPERLIPIDEMIA: ICD-10-CM

## 2025-08-25 DIAGNOSIS — Z82.49 FH: CORONARY ARTERY DISEASE: ICD-10-CM

## 2025-08-25 DIAGNOSIS — E03.9 ACQUIRED HYPOTHYROIDISM: ICD-10-CM

## 2025-08-25 DIAGNOSIS — Z82.0 FH: ALZHEIMER'S DISEASE: ICD-10-CM

## 2025-08-25 DIAGNOSIS — I10 ESSENTIAL HYPERTENSION: ICD-10-CM

## 2025-08-25 DIAGNOSIS — Z00.00 PHYSICAL EXAM: Primary | ICD-10-CM

## 2025-08-25 PROCEDURE — 99396 PREV VISIT EST AGE 40-64: CPT | Performed by: FAMILY MEDICINE

## 2025-08-25 RX ORDER — LEVOTHYROXINE SODIUM 137 UG/1
137 TABLET ORAL DAILY
Qty: 90 TABLET | Refills: 3 | Status: SHIPPED | OUTPATIENT
Start: 2025-08-25

## 2025-08-25 RX ORDER — HYDROCHLOROTHIAZIDE 12.5 MG/1
12.5 CAPSULE ORAL DAILY
Qty: 90 CAPSULE | Refills: 3 | Status: SHIPPED | OUTPATIENT
Start: 2025-08-25

## 2025-08-25 RX ORDER — LOSARTAN POTASSIUM 100 MG/1
100 TABLET ORAL DAILY
Qty: 90 TABLET | Refills: 3 | Status: SHIPPED | OUTPATIENT
Start: 2025-08-25